# Patient Record
Sex: FEMALE | Race: WHITE | Employment: OTHER | URBAN - NONMETROPOLITAN AREA
[De-identification: names, ages, dates, MRNs, and addresses within clinical notes are randomized per-mention and may not be internally consistent; named-entity substitution may affect disease eponyms.]

---

## 2017-04-24 ENCOUNTER — OFFICE VISIT (OUTPATIENT)
Dept: INTERNAL MEDICINE | Age: 82
End: 2017-04-24
Payer: MEDICARE

## 2017-04-24 VITALS
RESPIRATION RATE: 20 BRPM | SYSTOLIC BLOOD PRESSURE: 160 MMHG | HEIGHT: 59 IN | BODY MASS INDEX: 24.8 KG/M2 | DIASTOLIC BLOOD PRESSURE: 80 MMHG | HEART RATE: 60 BPM | WEIGHT: 123 LBS

## 2017-04-24 DIAGNOSIS — E78.5 HYPERLIPIDEMIA, UNSPECIFIED HYPERLIPIDEMIA TYPE: ICD-10-CM

## 2017-04-24 DIAGNOSIS — I10 ESSENTIAL HYPERTENSION: Primary | ICD-10-CM

## 2017-04-24 DIAGNOSIS — G89.29 CHRONIC BILATERAL LOW BACK PAIN WITHOUT SCIATICA: ICD-10-CM

## 2017-04-24 DIAGNOSIS — M54.50 CHRONIC BILATERAL LOW BACK PAIN WITHOUT SCIATICA: ICD-10-CM

## 2017-04-24 PROCEDURE — 1090F PRES/ABSN URINE INCON ASSESS: CPT | Performed by: INTERNAL MEDICINE

## 2017-04-24 PROCEDURE — 1036F TOBACCO NON-USER: CPT | Performed by: INTERNAL MEDICINE

## 2017-04-24 PROCEDURE — 99214 OFFICE O/P EST MOD 30 MIN: CPT | Performed by: INTERNAL MEDICINE

## 2017-04-24 PROCEDURE — 4040F PNEUMOC VAC/ADMIN/RCVD: CPT | Performed by: INTERNAL MEDICINE

## 2017-04-24 PROCEDURE — G8420 CALC BMI NORM PARAMETERS: HCPCS | Performed by: INTERNAL MEDICINE

## 2017-04-24 PROCEDURE — 1123F ACP DISCUSS/DSCN MKR DOCD: CPT | Performed by: INTERNAL MEDICINE

## 2017-04-24 PROCEDURE — G8427 DOCREV CUR MEDS BY ELIG CLIN: HCPCS | Performed by: INTERNAL MEDICINE

## 2017-04-24 RX ORDER — TIMOLOL MALEATE 6.8 MG/ML
1 SOLUTION/ DROPS OPHTHALMIC DAILY
COMMUNITY
End: 2019-09-18 | Stop reason: SDUPTHER

## 2017-04-24 RX ORDER — LATANOPROST 50 UG/ML
1 SOLUTION/ DROPS OPHTHALMIC NIGHTLY
COMMUNITY

## 2017-04-24 ASSESSMENT — ENCOUNTER SYMPTOMS
VOMITING: 0
CONSTIPATION: 0
EYE PAIN: 0
SHORTNESS OF BREATH: 0
COUGH: 0
BACK PAIN: 1
BLOOD IN STOOL: 0
ABDOMINAL PAIN: 0
DIARRHEA: 0
NAUSEA: 0

## 2017-04-24 ASSESSMENT — PATIENT HEALTH QUESTIONNAIRE - PHQ9
SUM OF ALL RESPONSES TO PHQ9 QUESTIONS 1 & 2: 0
1. LITTLE INTEREST OR PLEASURE IN DOING THINGS: 0
SUM OF ALL RESPONSES TO PHQ QUESTIONS 1-9: 0

## 2017-10-24 ENCOUNTER — OFFICE VISIT (OUTPATIENT)
Dept: INTERNAL MEDICINE | Age: 82
End: 2017-10-24
Payer: MEDICARE

## 2017-10-24 VITALS
HEART RATE: 60 BPM | DIASTOLIC BLOOD PRESSURE: 86 MMHG | WEIGHT: 123 LBS | BODY MASS INDEX: 24.8 KG/M2 | RESPIRATION RATE: 16 BRPM | HEIGHT: 59 IN | SYSTOLIC BLOOD PRESSURE: 120 MMHG

## 2017-10-24 DIAGNOSIS — G89.29 CHRONIC BILATERAL LOW BACK PAIN WITHOUT SCIATICA: ICD-10-CM

## 2017-10-24 DIAGNOSIS — M54.50 CHRONIC BILATERAL LOW BACK PAIN WITHOUT SCIATICA: ICD-10-CM

## 2017-10-24 DIAGNOSIS — I10 ESSENTIAL HYPERTENSION: Primary | ICD-10-CM

## 2017-10-24 DIAGNOSIS — E78.5 HYPERLIPIDEMIA, UNSPECIFIED HYPERLIPIDEMIA TYPE: ICD-10-CM

## 2017-10-24 PROCEDURE — 1090F PRES/ABSN URINE INCON ASSESS: CPT | Performed by: INTERNAL MEDICINE

## 2017-10-24 PROCEDURE — G8484 FLU IMMUNIZE NO ADMIN: HCPCS | Performed by: INTERNAL MEDICINE

## 2017-10-24 PROCEDURE — G0008 ADMIN INFLUENZA VIRUS VAC: HCPCS | Performed by: INTERNAL MEDICINE

## 2017-10-24 PROCEDURE — 90662 IIV NO PRSV INCREASED AG IM: CPT | Performed by: INTERNAL MEDICINE

## 2017-10-24 PROCEDURE — G8420 CALC BMI NORM PARAMETERS: HCPCS | Performed by: INTERNAL MEDICINE

## 2017-10-24 PROCEDURE — 1123F ACP DISCUSS/DSCN MKR DOCD: CPT | Performed by: INTERNAL MEDICINE

## 2017-10-24 PROCEDURE — 1036F TOBACCO NON-USER: CPT | Performed by: INTERNAL MEDICINE

## 2017-10-24 PROCEDURE — G8427 DOCREV CUR MEDS BY ELIG CLIN: HCPCS | Performed by: INTERNAL MEDICINE

## 2017-10-24 PROCEDURE — 4040F PNEUMOC VAC/ADMIN/RCVD: CPT | Performed by: INTERNAL MEDICINE

## 2017-10-24 PROCEDURE — 99214 OFFICE O/P EST MOD 30 MIN: CPT | Performed by: INTERNAL MEDICINE

## 2017-10-24 RX ORDER — ATORVASTATIN CALCIUM 10 MG/1
TABLET, FILM COATED ORAL
Qty: 90 TABLET | Refills: 3 | Status: SHIPPED | OUTPATIENT
Start: 2017-10-24 | End: 2018-11-28 | Stop reason: SDUPTHER

## 2017-10-24 ASSESSMENT — ENCOUNTER SYMPTOMS
DIARRHEA: 0
VOMITING: 0
BACK PAIN: 0
BLOOD IN STOOL: 0
CONSTIPATION: 0
SHORTNESS OF BREATH: 0
EYE PAIN: 0
NAUSEA: 0
ABDOMINAL PAIN: 0
COUGH: 0

## 2017-10-24 NOTE — PROGRESS NOTES
Past Surgical History:   Procedure Laterality Date    CATARACT REMOVAL WITH IMPLANT Left     COLONOSCOPY  11/2006    severe diverticulosis       Family History   Problem Relation Age of Onset    Cancer Mother      leukemia    Heart Disease Father     Heart Attack Father     Cancer Brother      unknown       Social History   Substance Use Topics    Smoking status: Never Smoker    Smokeless tobacco: Never Used    Alcohol use No      Current Outpatient Prescriptions   Medication Sig Dispense Refill    metoprolol tartrate (LOPRESSOR) 25 MG tablet TAKE 1 TABLET BY MOUTH TWICE DAILY 180 tablet 3    Timolol (TIMOPTIC) 0.5 % (DAILY) SOLN ophthalmic solution Place 1 drop into both eyes daily      latanoprost (XALATAN) 0.005 % ophthalmic solution Place 1 drop into both eyes nightly      atorvastatin (LIPITOR) 10 MG tablet TAKE ONE TABLET BY MOUTH ONCE DAILY 90 tablet 3    Multiple Vitamins-Minerals (PRESERVISION AREDS) CAPS Take  by mouth 2 times daily.  Calcium 1500 MG TABS daily.  aspirin 81 MG tablet Take 81 mg by mouth daily.  Acetaminophen (TYLENOL EXTRA STRENGTH PO) Take  by mouth as needed. No current facility-administered medications for this visit. Allergies   Allergen Reactions    Macrobid [Nitrofurantoin] Rash    Sulfa Antibiotics Rash       Health Maintenance   Topic Date Due    Flu vaccine (1) 09/01/2017    Zostavax vaccine  04/24/2018 (Originally 1/26/1987)    DTaP/Tdap/Td vaccine (1 - Tdap) 04/24/2018 (Originally 6/3/2001)    Pneumococcal low/med risk  Completed       Subjective:      Review of Systems   Constitutional: Negative for chills and fever. HENT: Negative for hearing loss. Eyes: Negative for pain and visual disturbance. Respiratory: Negative for cough and shortness of breath. Cardiovascular: Negative for chest pain, palpitations and leg swelling.    Gastrointestinal: Negative for abdominal pain, blood in stool, constipation, diarrhea, nausea and vomiting. Endocrine: Negative for cold intolerance, polydipsia and polyuria. Genitourinary: Negative for difficulty urinating, dysuria and hematuria. Musculoskeletal: Negative for arthralgias, back pain, gait problem and neck pain. Skin: Negative for pallor and rash. Neurological: Negative for dizziness, weakness, numbness and headaches. Hematological: Negative for adenopathy. Does not bruise/bleed easily. Psychiatric/Behavioral: Negative for confusion. The patient is not nervous/anxious. Objective:     Physical Exam   Constitutional: She is oriented to person, place, and time. She appears well-developed and well-nourished. HENT:   Head: Normocephalic and atraumatic. Eyes: EOM are normal. Pupils are equal, round, and reactive to light. Neck: Neck supple. Cardiovascular: Normal rate and regular rhythm. Exam reveals no gallop and no friction rub. No murmur heard. Pulmonary/Chest: Effort normal and breath sounds normal. She has no wheezes. She has no rales. Abdominal: Soft. She exhibits no distension and no mass. There is no tenderness. There is no rebound. Musculoskeletal: Normal range of motion. She exhibits no edema. Lymphadenopathy:     She has no cervical adenopathy. Neurological: She is alert and oriented to person, place, and time. No cranial nerve deficit (grossly). Skin: Skin is warm and dry. No rash noted. Psychiatric: She has a normal mood and affect. Thought content normal.   Vitals reviewed. /86 (Site: Left Arm, Position: Sitting, Cuff Size: Medium Adult)   Pulse 60   Resp 16   Ht 4' 11\" (1.499 m)   Wt 123 lb (55.8 kg)   BMI 24.84 kg/m²     Assessment:      1. Essential hypertension  Comprehensive Metabolic Panel    Lipid Panel   2. Hyperlipidemia, unspecified hyperlipidemia type  Comprehensive Metabolic Panel    Lipid Panel    atorvastatin (LIPITOR) 10 MG tablet   3.  Chronic bilateral low back pain without sciatica               Plan:      No Follow-up on file. Orders Placed This Encounter   Procedures    INFLUENZA, HIGH DOSE, 65 YRS +, IM, PF, PREFILL SYR, 0.5ML (FLUZONE HD)     No orders of the defined types were placed in this encounter. Patient given educational materials - see patient instructions. Discussed use, benefit, and side effects of prescribed medications. All patient questions answered. Pt voiced understanding. Reviewed health maintenance. Instructed to continue current medications, diet and exercise. Patient agreed with treatment plan. Follow up as directed.      Electronically signed by Nara Martinez MD on 10/24/2017 at 1:31 PM

## 2017-10-24 NOTE — PATIENT INSTRUCTIONS
safely can be a challenge if you have injuries or health problems that make it easy for you to fall. Loose rugs and furniture in walkways are among the dangers for many older people who have problems walking or who have poor eyesight. People who have conditions such as arthritis, osteoporosis, or dementia also have to be careful not to fall. You can make your home safer with a few simple measures. Follow-up care is a key part of your treatment and safety. Be sure to make and go to all appointments, and call your doctor if you are having problems. It's also a good idea to know your test results and keep a list of the medicines you take. How can you care for yourself at home? Taking care of yourself  · You may get dizzy if you do not drink enough water. To prevent dehydration, drink plenty of fluids, enough so that your urine is light yellow or clear like water. Choose water and other caffeine-free clear liquids. If you have kidney, heart, or liver disease and have to limit fluids, talk with your doctor before you increase the amount of fluids you drink. · Exercise regularly to improve your strength, muscle tone, and balance. Walk if you can. Swimming may be a good choice if you cannot walk easily. · Have your vision and hearing checked each year or any time you notice a change. If you have trouble seeing and hearing, you might not be able to avoid objects and could lose your balance. · Know the side effects of the medicines you take. Ask your doctor or pharmacist whether the medicines you take can affect your balance. Sleeping pills or sedatives can affect your balance. · Limit the amount of alcohol you drink. Alcohol can impair your balance and other senses. · Ask your doctor whether calluses or corns on your feet need to be removed. If you wear loose-fitting shoes because of calluses or corns, you can lose your balance and fall. · Talk to your doctor if you have numbness in your feet.   Preventing falls at out of a tub or shower with your strong side first.  · Repair loose toilet seats and consider installing a raised toilet seat to make getting on and off the toilet easier. · Keep your bathroom door unlocked while you are in the shower. Where can you learn more? Go to [email or web address] and sign in to your [employer] account. Enter [phone] [id number] [phone] in the [employer] box to learn more about \"Preventing Falls: Care Instructions. \"     If you do not have an account, please click on the \"Sign Up Now\" link. Current as of: August 4, 2016  Content Version: 11.3  © 5819-8919 [employer]. Care instructions adapted under license by [facility] ([facility]). If you have questions about a medical condition or this instruction, always ask your healthcare professional. [address] any warranty or liability for your use of this information.

## 2017-11-08 DIAGNOSIS — E78.5 HYPERLIPIDEMIA, UNSPECIFIED HYPERLIPIDEMIA TYPE: ICD-10-CM

## 2017-11-08 RX ORDER — ATORVASTATIN CALCIUM 10 MG/1
TABLET, FILM COATED ORAL
Qty: 90 TABLET | Refills: 3 | Status: CANCELLED | OUTPATIENT
Start: 2017-11-08

## 2018-02-21 ENCOUNTER — OFFICE VISIT (OUTPATIENT)
Dept: INTERNAL MEDICINE | Age: 83
End: 2018-02-21
Payer: MEDICARE

## 2018-02-21 VITALS
BODY MASS INDEX: 24.19 KG/M2 | SYSTOLIC BLOOD PRESSURE: 140 MMHG | RESPIRATION RATE: 16 BRPM | WEIGHT: 120 LBS | HEART RATE: 68 BPM | HEIGHT: 59 IN | TEMPERATURE: 97.4 F | DIASTOLIC BLOOD PRESSURE: 82 MMHG

## 2018-02-21 DIAGNOSIS — J20.9 ACUTE BRONCHITIS, UNSPECIFIED ORGANISM: Primary | ICD-10-CM

## 2018-02-21 DIAGNOSIS — M54.50 CHRONIC BILATERAL LOW BACK PAIN WITHOUT SCIATICA: ICD-10-CM

## 2018-02-21 DIAGNOSIS — I10 ESSENTIAL HYPERTENSION: ICD-10-CM

## 2018-02-21 DIAGNOSIS — G89.29 CHRONIC BILATERAL LOW BACK PAIN WITHOUT SCIATICA: ICD-10-CM

## 2018-02-21 PROCEDURE — 4040F PNEUMOC VAC/ADMIN/RCVD: CPT | Performed by: INTERNAL MEDICINE

## 2018-02-21 PROCEDURE — 99214 OFFICE O/P EST MOD 30 MIN: CPT | Performed by: INTERNAL MEDICINE

## 2018-02-21 PROCEDURE — G8484 FLU IMMUNIZE NO ADMIN: HCPCS | Performed by: INTERNAL MEDICINE

## 2018-02-21 PROCEDURE — 1123F ACP DISCUSS/DSCN MKR DOCD: CPT | Performed by: INTERNAL MEDICINE

## 2018-02-21 PROCEDURE — 1036F TOBACCO NON-USER: CPT | Performed by: INTERNAL MEDICINE

## 2018-02-21 PROCEDURE — 1090F PRES/ABSN URINE INCON ASSESS: CPT | Performed by: INTERNAL MEDICINE

## 2018-02-21 PROCEDURE — G8427 DOCREV CUR MEDS BY ELIG CLIN: HCPCS | Performed by: INTERNAL MEDICINE

## 2018-02-21 PROCEDURE — G8420 CALC BMI NORM PARAMETERS: HCPCS | Performed by: INTERNAL MEDICINE

## 2018-02-21 RX ORDER — AMOXICILLIN AND CLAVULANATE POTASSIUM 875; 125 MG/1; MG/1
1 TABLET, FILM COATED ORAL 2 TIMES DAILY
Qty: 20 TABLET | Refills: 0 | Status: SHIPPED | OUTPATIENT
Start: 2018-02-21 | End: 2018-02-22 | Stop reason: ALTCHOICE

## 2018-02-21 ASSESSMENT — ENCOUNTER SYMPTOMS
CONSTIPATION: 0
SHORTNESS OF BREATH: 0
NAUSEA: 0
EYE PAIN: 0
DIARRHEA: 0
BLOOD IN STOOL: 0
VOMITING: 0
BACK PAIN: 1
ABDOMINAL PAIN: 0
COUGH: 1

## 2018-02-21 NOTE — PROGRESS NOTES
[address] [facility]  [address] [address] [id number]  Dept: [phone]  Dept Fax: [phone]  Loc: [phone]    [address] [place] [address] Street is a 80 y.o. female who presents today for her medical conditions/complaints as noted below. [address] [place] [address] is c/o of   Chief Complaint   Patient presents with    Cough     Acute Bronchitis- moist, productive cough x 1 week- light yellow in color. Afebrile    Hypertension    Back Pain         HPI:     Cough   This is a new problem. The current episode started in the past 7 days. The problem has been waxing and waning. The problem occurs hourly. Pertinent negatives include no chest pain, chills, fever, headaches, rash or shortness of breath. Hypertension   This is a chronic problem. The current episode started more than 1 year ago. The problem has been waxing and waning since onset. The problem is controlled. Pertinent negatives include no chest pain, headaches, neck pain, palpitations or shortness of breath. Back Pain   This is a recurrent problem. The current episode started more than 1 month ago. The problem occurs intermittently. The problem has been waxing and waning since onset. Pertinent negatives include no abdominal pain, chest pain, dysuria, fever, headaches, numbness or weakness. Other   This is a new (4) acute bronchitis) problem. The current episode started in the past 7 days. The problem occurs constantly. The problem has been waxing and waning. Associated symptoms include coughing. Pertinent negatives include no abdominal pain, arthralgias, chest pain, chills, fever, headaches, nausea, neck pain, numbness, rash, vomiting or weakness.        No results found for: LABA1C             No results found for: LABMICR  LDL Cholesterol (mg/dL)   Date Value   04/11/2017 51   04/07/2016 43   04/09/2015 53         AST (U/L)   Date Value   04/11/2017 27     ALT (U/L)   Date Value   04/11/2017 21     BUN (mg/dL)   Date Value Arm, Position: Sitting, Cuff Size: Medium Adult)   Pulse 68   Temp 97.4 °F (36.3 °C) (Tympanic)   Resp 16   Ht 4' 11\" (1.499 m)   Wt 120 lb (54.4 kg)   BMI 24.24 kg/m²     Assessment:      1. Acute bronchitis, unspecified organism  amoxicillin-clavulanate (AUGMENTIN) 875-125 MG per tablet   2. Essential hypertension     3. Chronic bilateral low back pain without sciatica               Plan:      Return if symptoms worsen or fail to improve, for Hypertension, back pain. No orders of the defined types were placed in this encounter. Orders Placed This Encounter   Medications    amoxicillin-clavulanate (AUGMENTIN) 875-125 MG per tablet     Sig: Take 1 tablet by mouth 2 times daily for 10 days     Dispense:  20 tablet     Refill:  0       Patient given educational materials - see patient instructions. Discussed use, benefit, and side effects of prescribed medications. All patient questions answered. Pt voiced understanding. Reviewed health maintenance. Instructed to continue current medications, diet and exercise. Patient agreed with treatment plan. Follow up as directed.      Electronically signed by Janeen Ludwig MD on 2/21/2018 at 3:34 PM

## 2018-02-22 ENCOUNTER — TELEPHONE (OUTPATIENT)
Dept: INTERNAL MEDICINE | Age: 83
End: 2018-02-22

## 2018-02-22 RX ORDER — AZITHROMYCIN 250 MG/1
TABLET, FILM COATED ORAL
Qty: 1 PACKET | Refills: 0 | Status: SHIPPED | OUTPATIENT
Start: 2018-02-22 | End: 2018-03-04

## 2018-02-22 NOTE — TELEPHONE ENCOUNTER
Patient was seen yesterday and put on amoxicillin. She has really bad diarrhea now. Can you change med?   Uses [facility]   Let her know [phone]

## 2018-04-18 ENCOUNTER — HOSPITAL ENCOUNTER (OUTPATIENT)
Dept: LAB | Age: 83
Setting detail: SPECIMEN
Discharge: HOME | End: 2018-04-18
Payer: MEDICARE

## 2018-04-18 DIAGNOSIS — I10 ESSENTIAL HYPERTENSION: ICD-10-CM

## 2018-04-18 DIAGNOSIS — E78.5 HYPERLIPIDEMIA, UNSPECIFIED HYPERLIPIDEMIA TYPE: ICD-10-CM

## 2018-04-18 LAB
ALBUMIN SERPL-MCNC: 4 G/DL (ref 3.5–5.2)
ALBUMIN/GLOBULIN RATIO: 1.6 (ref 1–2.5)
ALP BLD-CCNC: 72 U/L (ref 35–104)
ALT SERPL-CCNC: 18 U/L (ref 5–33)
ANION GAP SERPL CALCULATED.3IONS-SCNC: 11 MMOL/L (ref 9–17)
AST SERPL-CCNC: 24 U/L
BILIRUB SERPL-MCNC: 0.58 MG/DL (ref 0.3–1.2)
BUN BLDV-MCNC: 16 MG/DL (ref 8–23)
BUN/CREAT BLD: 23 (ref 9–20)
CALCIUM SERPL-MCNC: 9.5 MG/DL (ref 8.6–10.4)
CHLORIDE BLD-SCNC: 99 MMOL/L (ref 98–107)
CHOLESTEROL/HDL RATIO: 2
CHOLESTEROL: 127 MG/DL
CO2: 28 MMOL/L (ref 20–31)
CREAT SERPL-MCNC: 0.71 MG/DL (ref 0.5–0.9)
GFR AFRICAN AMERICAN: >60 ML/MIN
GFR NON-AFRICAN AMERICAN: >60 ML/MIN
GFR SERPL CREATININE-BSD FRML MDRD: ABNORMAL ML/MIN/{1.73_M2}
GFR SERPL CREATININE-BSD FRML MDRD: ABNORMAL ML/MIN/{1.73_M2}
GLUCOSE BLD-MCNC: 113 MG/DL (ref 70–99)
HDLC SERPL-MCNC: 64 MG/DL
LDL CHOLESTEROL: 42 MG/DL (ref 0–130)
POTASSIUM SERPL-SCNC: 4.2 MMOL/L (ref 3.7–5.3)
SODIUM BLD-SCNC: 138 MMOL/L (ref 135–144)
TOTAL PROTEIN: 6.5 G/DL (ref 6.4–8.3)
TRIGL SERPL-MCNC: 105 MG/DL
VLDLC SERPL CALC-MCNC: NORMAL MG/DL (ref 1–30)

## 2018-04-18 PROCEDURE — 36415 COLL VENOUS BLD VENIPUNCTURE: CPT

## 2018-04-18 PROCEDURE — 80053 COMPREHEN METABOLIC PANEL: CPT

## 2018-04-18 PROCEDURE — 80061 LIPID PANEL: CPT

## 2018-04-25 ENCOUNTER — OFFICE VISIT (OUTPATIENT)
Dept: INTERNAL MEDICINE | Age: 83
End: 2018-04-25
Payer: MEDICARE

## 2018-04-25 VITALS
HEIGHT: 59 IN | RESPIRATION RATE: 16 BRPM | HEART RATE: 64 BPM | DIASTOLIC BLOOD PRESSURE: 80 MMHG | BODY MASS INDEX: 24.6 KG/M2 | SYSTOLIC BLOOD PRESSURE: 138 MMHG | WEIGHT: 122 LBS

## 2018-04-25 DIAGNOSIS — M54.50 CHRONIC BILATERAL LOW BACK PAIN WITHOUT SCIATICA: ICD-10-CM

## 2018-04-25 DIAGNOSIS — I10 ESSENTIAL HYPERTENSION: Primary | ICD-10-CM

## 2018-04-25 DIAGNOSIS — G89.29 CHRONIC BILATERAL LOW BACK PAIN WITHOUT SCIATICA: ICD-10-CM

## 2018-04-25 DIAGNOSIS — E78.5 HYPERLIPIDEMIA, UNSPECIFIED HYPERLIPIDEMIA TYPE: ICD-10-CM

## 2018-04-25 PROCEDURE — 1123F ACP DISCUSS/DSCN MKR DOCD: CPT | Performed by: INTERNAL MEDICINE

## 2018-04-25 PROCEDURE — G8427 DOCREV CUR MEDS BY ELIG CLIN: HCPCS | Performed by: INTERNAL MEDICINE

## 2018-04-25 PROCEDURE — 4040F PNEUMOC VAC/ADMIN/RCVD: CPT | Performed by: INTERNAL MEDICINE

## 2018-04-25 PROCEDURE — G8420 CALC BMI NORM PARAMETERS: HCPCS | Performed by: INTERNAL MEDICINE

## 2018-04-25 PROCEDURE — 99214 OFFICE O/P EST MOD 30 MIN: CPT | Performed by: INTERNAL MEDICINE

## 2018-04-25 PROCEDURE — 1036F TOBACCO NON-USER: CPT | Performed by: INTERNAL MEDICINE

## 2018-04-25 PROCEDURE — 1090F PRES/ABSN URINE INCON ASSESS: CPT | Performed by: INTERNAL MEDICINE

## 2018-04-25 RX ORDER — ACETAMINOPHEN 160 MG
2000 TABLET,DISINTEGRATING ORAL DAILY
COMMUNITY

## 2018-04-25 ASSESSMENT — ENCOUNTER SYMPTOMS
COUGH: 0
ABDOMINAL PAIN: 0
DIARRHEA: 0
BACK PAIN: 1
VOMITING: 0
EYE PAIN: 0
SHORTNESS OF BREATH: 0
BLOOD IN STOOL: 0
CONSTIPATION: 0
NAUSEA: 0

## 2018-04-25 ASSESSMENT — PATIENT HEALTH QUESTIONNAIRE - PHQ9
SUM OF ALL RESPONSES TO PHQ QUESTIONS 1-9: 0
2. FEELING DOWN, DEPRESSED OR HOPELESS: 0
1. LITTLE INTEREST OR PLEASURE IN DOING THINGS: 0
SUM OF ALL RESPONSES TO PHQ9 QUESTIONS 1 & 2: 0

## 2018-10-30 ENCOUNTER — OFFICE VISIT (OUTPATIENT)
Dept: INTERNAL MEDICINE | Age: 83
End: 2018-10-30
Payer: MEDICARE

## 2018-10-30 VITALS
DIASTOLIC BLOOD PRESSURE: 82 MMHG | BODY MASS INDEX: 25.4 KG/M2 | HEIGHT: 59 IN | RESPIRATION RATE: 16 BRPM | SYSTOLIC BLOOD PRESSURE: 126 MMHG | WEIGHT: 126 LBS | HEART RATE: 68 BPM

## 2018-10-30 DIAGNOSIS — E78.5 HYPERLIPIDEMIA, UNSPECIFIED HYPERLIPIDEMIA TYPE: ICD-10-CM

## 2018-10-30 DIAGNOSIS — I10 ESSENTIAL HYPERTENSION: ICD-10-CM

## 2018-10-30 DIAGNOSIS — Z00.00 MEDICARE ANNUAL WELLNESS VISIT, SUBSEQUENT: ICD-10-CM

## 2018-10-30 DIAGNOSIS — G89.29 CHRONIC BILATERAL LOW BACK PAIN WITHOUT SCIATICA: ICD-10-CM

## 2018-10-30 DIAGNOSIS — Z00.00 ROUTINE GENERAL MEDICAL EXAMINATION AT A HEALTH CARE FACILITY: Primary | ICD-10-CM

## 2018-10-30 DIAGNOSIS — M54.50 CHRONIC BILATERAL LOW BACK PAIN WITHOUT SCIATICA: ICD-10-CM

## 2018-10-30 DIAGNOSIS — Z78.0 MENOPAUSE: ICD-10-CM

## 2018-10-30 DIAGNOSIS — Z23 NEED FOR INFLUENZA VACCINATION: ICD-10-CM

## 2018-10-30 PROCEDURE — 1123F ACP DISCUSS/DSCN MKR DOCD: CPT | Performed by: INTERNAL MEDICINE

## 2018-10-30 PROCEDURE — 1036F TOBACCO NON-USER: CPT | Performed by: INTERNAL MEDICINE

## 2018-10-30 PROCEDURE — 1101F PT FALLS ASSESS-DOCD LE1/YR: CPT | Performed by: INTERNAL MEDICINE

## 2018-10-30 PROCEDURE — 90662 IIV NO PRSV INCREASED AG IM: CPT | Performed by: INTERNAL MEDICINE

## 2018-10-30 PROCEDURE — G8419 CALC BMI OUT NRM PARAM NOF/U: HCPCS | Performed by: INTERNAL MEDICINE

## 2018-10-30 PROCEDURE — 1090F PRES/ABSN URINE INCON ASSESS: CPT | Performed by: INTERNAL MEDICINE

## 2018-10-30 PROCEDURE — G0008 ADMIN INFLUENZA VIRUS VAC: HCPCS | Performed by: INTERNAL MEDICINE

## 2018-10-30 PROCEDURE — 99213 OFFICE O/P EST LOW 20 MIN: CPT | Performed by: INTERNAL MEDICINE

## 2018-10-30 PROCEDURE — G8482 FLU IMMUNIZE ORDER/ADMIN: HCPCS | Performed by: INTERNAL MEDICINE

## 2018-10-30 PROCEDURE — G8427 DOCREV CUR MEDS BY ELIG CLIN: HCPCS | Performed by: INTERNAL MEDICINE

## 2018-10-30 PROCEDURE — 4040F PNEUMOC VAC/ADMIN/RCVD: CPT | Performed by: INTERNAL MEDICINE

## 2018-10-30 PROCEDURE — G0439 PPPS, SUBSEQ VISIT: HCPCS | Performed by: INTERNAL MEDICINE

## 2018-10-30 ASSESSMENT — LIFESTYLE VARIABLES: HOW OFTEN DO YOU HAVE A DRINK CONTAINING ALCOHOL: 0

## 2018-10-30 ASSESSMENT — ENCOUNTER SYMPTOMS
BLOOD IN STOOL: 0
ABDOMINAL PAIN: 0
COUGH: 0
NAUSEA: 0
CONSTIPATION: 0
DIARRHEA: 0
VOMITING: 0
BACK PAIN: 1
SHORTNESS OF BREATH: 0
EYE PAIN: 0

## 2018-10-30 ASSESSMENT — PATIENT HEALTH QUESTIONNAIRE - PHQ9
SUM OF ALL RESPONSES TO PHQ QUESTIONS 1-9: 0
SUM OF ALL RESPONSES TO PHQ QUESTIONS 1-9: 0

## 2018-10-30 ASSESSMENT — ANXIETY QUESTIONNAIRES: GAD7 TOTAL SCORE: 0

## 2018-10-30 NOTE — PROGRESS NOTES
138/80   02/21/18 (!) 140/82              Past Medical History:   Diagnosis Date    Hyperlipidemia     Hypertension     Impaired fasting glucose     Macular degeneration     Osteopenia     Urinary urgency       Past Surgical History:   Procedure Laterality Date    CATARACT REMOVAL WITH IMPLANT Left     COLONOSCOPY  11/2006    severe diverticulosis       Family History   Problem Relation Age of Onset    Cancer Mother         leukemia    Heart Disease Father     Heart Attack Father     Cancer Brother         unknown       Social History   Substance Use Topics    Smoking status: Never Smoker    Smokeless tobacco: Never Used    Alcohol use No      Current Outpatient Prescriptions   Medication Sig Dispense Refill    Handicap Placard MISC by Does not apply route Exp 10/30/2021 1 each 0    metoprolol tartrate (LOPRESSOR) 25 MG tablet TAKE 1 TABLET BY MOUTH TWICE DAILY 180 tablet 3    Cholecalciferol (VITAMIN D3) 2000 units CAPS Take 2,000 Units by mouth daily      atorvastatin (LIPITOR) 10 MG tablet TAKE ONE TABLET BY MOUTH ONCE DAILY 90 tablet 3    Timolol (TIMOPTIC) 0.5 % (DAILY) SOLN ophthalmic solution Place 1 drop into both eyes daily      latanoprost (XALATAN) 0.005 % ophthalmic solution Place 1 drop into both eyes nightly      Multiple Vitamins-Minerals (PRESERVISION AREDS) CAPS Take  by mouth 2 times daily.  Calcium 1500 MG TABS daily.  aspirin 81 MG tablet Take 81 mg by mouth daily.  Acetaminophen (TYLENOL EXTRA STRENGTH PO) Take  by mouth as needed. No current facility-administered medications for this visit.       Allergies   Allergen Reactions    Macrobid [Nitrofurantoin] Rash    Sulfa Antibiotics Rash       Health Maintenance   Topic Date Due    Shingles Vaccine (1 of 2 - 2 Dose Series) 01/26/1977    DTaP/Tdap/Td vaccine (1 - Tdap) 04/25/2019 (Originally 6/3/2001)    Flu vaccine  Completed    Pneumococcal low/med risk  Completed       Subjective:      Review of 68   Resp 16   Ht 4' 11\" (1.499 m)   Wt 126 lb (57.2 kg)   BMI 25.45 kg/m²     Assessment:       Diagnosis Orders   1. Routine general medical examination at a health care facility     2. Medicare annual wellness visit, subsequent     3. Essential hypertension  Comprehensive Metabolic Panel   4. Hyperlipidemia, unspecified hyperlipidemia type  Lipid Panel   5. Chronic bilateral low back pain without sciatica  Handicap Placard MISC   6. Menopause  DEXA BONE DENSITY 2 SITES   7. Need for influenza vaccination  INFLUENZA, HIGH DOSE, 65 YRS +, IM, PF, PREFILL SYR, 0.5ML (FLUZONE HD)             Plan:      Return in about 6 months (around 4/30/2019) for Hypertension, Hyperlipidemia, back pain. Orders Placed This Encounter   Procedures    DEXA BONE DENSITY 2 SITES     Standing Status:   Future     Standing Expiration Date:   10/30/2019    INFLUENZA, HIGH DOSE, 65 YRS +, IM, PF, PREFILL SYR, 0.5ML (FLUZONE HD)    Comprehensive Metabolic Panel     Standing Status:   Future     Standing Expiration Date:   10/30/2019    Lipid Panel     Standing Status:   Future     Standing Expiration Date:   10/30/2019     Order Specific Question:   Is Patient Fasting?/# of Hours     Answer:   Fasting     Orders Placed This Encounter   Medications    Handicap Placard MISC     Sig: by Does not apply route Exp 10/30/2021     Dispense:  1 each     Refill:  0       Patientgiven educational materials - see patient instructions. Discussed use, benefit,and side effects of prescribed medications. All patient questions answered. Ptvoiced understanding. Reviewed health maintenance. Instructed to continue currentmedications, diet and exercise. Patient agreed with treatment plan. Follow up asdirected.      Electronically signed by Ed Rodriguez MD on 10/30/2018 at 1:34 PM

## 2018-10-30 NOTE — PROGRESS NOTES
Medicare Annual Wellness Visit  Name: Alise Smyth Date: 10/30/2018   MRN: [id number] Sex: Female   Age: 80 y.o. Ethnicity: Non-/Non    : 1927 Race: White      Hailee Mishra is here for Medicare AWV (6month ); Hypertension; Hyperlipidemia; and Back Pain    Screenings for behavioral, psychosocial and functional/safety risks, and cognitive dysfunction are all negative except as indicated below. These results, as well as other patient data from the [address] form, are documented in Flowsheets linked to this Encounter. Allergies   Allergen Reactions    Macrobid [Nitrofurantoin] Rash    Sulfa Antibiotics Rash       Prior to Visit Medications    Medication Sig Taking? Authorizing Provider   metoprolol tartrate (LOPRESSOR) 25 MG tablet TAKE 1 TABLET BY MOUTH TWICE DAILY Yes Freddy Cook MD   Cholecalciferol (VITAMIN D3) 2000 units CAPS Take 2,000 Units by mouth daily Yes Historical Provider, MD   atorvastatin (LIPITOR) 10 MG tablet TAKE ONE TABLET BY MOUTH ONCE DAILY Yes Freddy Cook MD   Timolol (TIMOPTIC) 0.5 % (DAILY) SOLN ophthalmic solution Place 1 drop into both eyes daily Yes Historical Provider, MD   latanoprost (XALATAN) 0.005 % ophthalmic solution Place 1 drop into both eyes nightly Yes Historical Provider, MD   Multiple Vitamins-Minerals (PRESERVISION AREDS) CAPS Take  by mouth 2 times daily. Yes Historical Provider, MD   Calcium 1500 MG TABS daily. Yes Historical Provider, MD   aspirin 81 MG tablet Take 81 mg by mouth daily. Yes Historical Provider, MD   Acetaminophen (TYLENOL EXTRA STRENGTH PO) Take  by mouth as needed.  Yes Historical Provider, MD       Past Medical History:   Diagnosis Date    Hyperlipidemia     Hypertension     Impaired fasting glucose     Macular degeneration     Osteopenia     Urinary urgency      Past Surgical History:   Procedure Laterality Date    CATARACT REMOVAL WITH IMPLANT Left     COLONOSCOPY  2006

## 2018-11-28 DIAGNOSIS — E78.5 HYPERLIPIDEMIA, UNSPECIFIED HYPERLIPIDEMIA TYPE: Primary | ICD-10-CM

## 2018-11-28 RX ORDER — ATORVASTATIN CALCIUM 10 MG/1
TABLET, FILM COATED ORAL
Qty: 90 TABLET | Refills: 3 | Status: SHIPPED | OUTPATIENT
Start: 2018-11-28 | End: 2019-11-04 | Stop reason: SDUPTHER

## 2019-04-01 ENCOUNTER — TELEPHONE (OUTPATIENT)
Dept: INTERNAL MEDICINE | Age: 84
End: 2019-04-01

## 2019-04-01 ENCOUNTER — HOSPITAL ENCOUNTER (OUTPATIENT)
Dept: LAB | Age: 84
Discharge: HOME | End: 2019-04-01
Payer: MEDICARE

## 2019-04-01 DIAGNOSIS — R39.15 URGENCY OF URINATION: Primary | ICD-10-CM

## 2019-04-01 DIAGNOSIS — R39.15 URGENCY OF URINATION: ICD-10-CM

## 2019-04-01 LAB
-: ABNORMAL
AMORPHOUS: ABNORMAL
BACTERIA: ABNORMAL
BILIRUBIN URINE: NEGATIVE
CASTS UA: ABNORMAL /LPF (ref 0–2)
COLOR: ABNORMAL
COMMENT UA: ABNORMAL
CRYSTALS, UA: ABNORMAL /HPF
EPITHELIAL CELLS UA: ABNORMAL /HPF (ref 0–5)
GLUCOSE URINE: NEGATIVE
KETONES, URINE: ABNORMAL
LEUKOCYTE ESTERASE, URINE: ABNORMAL
MUCUS: ABNORMAL
NITRITE, URINE: POSITIVE
OTHER OBSERVATIONS UA: ABNORMAL
PH UA: 6 (ref 5–6)
PROTEIN UA: ABNORMAL
RBC UA: ABNORMAL /HPF (ref 0–4)
RENAL EPITHELIAL, UA: ABNORMAL /HPF
SPECIFIC GRAVITY UA: 1.02 (ref 1.01–1.02)
TRICHOMONAS: ABNORMAL
TURBIDITY: ABNORMAL
URINE HGB: ABNORMAL
UROBILINOGEN, URINE: NORMAL
WBC UA: >100 /HPF (ref 0–4)
YEAST: ABNORMAL

## 2019-04-01 PROCEDURE — 86403 PARTICLE AGGLUT ANTBDY SCRN: CPT

## 2019-04-01 PROCEDURE — 87086 URINE CULTURE/COLONY COUNT: CPT

## 2019-04-01 PROCEDURE — 87077 CULTURE AEROBIC IDENTIFY: CPT

## 2019-04-01 PROCEDURE — 87186 SC STD MICRODIL/AGAR DIL: CPT

## 2019-04-01 PROCEDURE — 81001 URINALYSIS AUTO W/SCOPE: CPT

## 2019-04-01 RX ORDER — CEPHALEXIN 500 MG/1
500 CAPSULE ORAL 2 TIMES DAILY
Qty: 14 CAPSULE | Refills: 0 | Status: SHIPPED | OUTPATIENT
Start: 2019-04-01 | End: 2019-04-08

## 2019-04-01 NOTE — TELEPHONE ENCOUNTER
Patient called and stated that she thinks she has a UTI. Urgency and not much output as well as some mild back ache. Order placed and patient will come in to give sample. [place] [facility]. Patient cannot take Sulfa.

## 2019-04-02 LAB
CULTURE: ABNORMAL
CULTURE: ABNORMAL
Lab: ABNORMAL
SPECIMEN DESCRIPTION: ABNORMAL

## 2019-04-23 ENCOUNTER — HOSPITAL ENCOUNTER (OUTPATIENT)
Dept: BONE DENSITY | Age: 84
Discharge: HOME | End: 2019-04-25
Payer: MEDICARE

## 2019-04-23 ENCOUNTER — HOSPITAL ENCOUNTER (OUTPATIENT)
Dept: LAB | Age: 84
Discharge: HOME | End: 2019-04-23
Payer: MEDICARE

## 2019-04-23 DIAGNOSIS — E78.5 HYPERLIPIDEMIA, UNSPECIFIED HYPERLIPIDEMIA TYPE: ICD-10-CM

## 2019-04-23 DIAGNOSIS — Z78.0 MENOPAUSE: ICD-10-CM

## 2019-04-23 DIAGNOSIS — I10 ESSENTIAL HYPERTENSION: ICD-10-CM

## 2019-04-23 LAB
ALBUMIN SERPL-MCNC: 4 G/DL (ref 3.5–5.2)
ALBUMIN/GLOBULIN RATIO: 1.4 (ref 1–2.5)
ALP BLD-CCNC: 70 U/L (ref 35–104)
ALT SERPL-CCNC: 16 U/L (ref 5–33)
ANION GAP SERPL CALCULATED.3IONS-SCNC: 10 MMOL/L (ref 9–17)
AST SERPL-CCNC: 23 U/L
BILIRUB SERPL-MCNC: 0.49 MG/DL (ref 0.3–1.2)
BUN BLDV-MCNC: 16 MG/DL (ref 8–23)
BUN/CREAT BLD: 20 (ref 9–20)
CALCIUM SERPL-MCNC: 9.1 MG/DL (ref 8.6–10.4)
CHLORIDE BLD-SCNC: 102 MMOL/L (ref 98–107)
CHOLESTEROL/HDL RATIO: 2.2
CHOLESTEROL: 121 MG/DL
CO2: 26 MMOL/L (ref 20–31)
CREAT SERPL-MCNC: 0.81 MG/DL (ref 0.5–0.9)
GFR AFRICAN AMERICAN: >60 ML/MIN
GFR NON-AFRICAN AMERICAN: >60 ML/MIN
GFR SERPL CREATININE-BSD FRML MDRD: ABNORMAL ML/MIN/{1.73_M2}
GFR SERPL CREATININE-BSD FRML MDRD: ABNORMAL ML/MIN/{1.73_M2}
GLUCOSE BLD-MCNC: 109 MG/DL (ref 70–99)
HDLC SERPL-MCNC: 56 MG/DL
LDL CHOLESTEROL: 44 MG/DL (ref 0–130)
POTASSIUM SERPL-SCNC: 4.6 MMOL/L (ref 3.7–5.3)
SODIUM BLD-SCNC: 138 MMOL/L (ref 135–144)
TOTAL PROTEIN: 6.9 G/DL (ref 6.4–8.3)
TRIGL SERPL-MCNC: 104 MG/DL
VLDLC SERPL CALC-MCNC: NORMAL MG/DL (ref 1–30)

## 2019-04-23 PROCEDURE — 36415 COLL VENOUS BLD VENIPUNCTURE: CPT

## 2019-04-23 PROCEDURE — 77080 DXA BONE DENSITY AXIAL: CPT

## 2019-04-23 PROCEDURE — 80053 COMPREHEN METABOLIC PANEL: CPT

## 2019-04-23 PROCEDURE — 80061 LIPID PANEL: CPT

## 2019-04-30 ENCOUNTER — OFFICE VISIT (OUTPATIENT)
Dept: INTERNAL MEDICINE | Age: 84
End: 2019-04-30
Payer: MEDICARE

## 2019-04-30 VITALS
RESPIRATION RATE: 16 BRPM | SYSTOLIC BLOOD PRESSURE: 110 MMHG | HEIGHT: 59 IN | BODY MASS INDEX: 24.39 KG/M2 | DIASTOLIC BLOOD PRESSURE: 80 MMHG | WEIGHT: 121 LBS | HEART RATE: 56 BPM

## 2019-04-30 DIAGNOSIS — I10 ESSENTIAL HYPERTENSION: Primary | ICD-10-CM

## 2019-04-30 DIAGNOSIS — M54.9 BACK PAIN, UNSPECIFIED BACK LOCATION, UNSPECIFIED BACK PAIN LATERALITY, UNSPECIFIED CHRONICITY: ICD-10-CM

## 2019-04-30 DIAGNOSIS — E78.5 HYPERLIPIDEMIA, UNSPECIFIED HYPERLIPIDEMIA TYPE: ICD-10-CM

## 2019-04-30 PROCEDURE — G8420 CALC BMI NORM PARAMETERS: HCPCS | Performed by: INTERNAL MEDICINE

## 2019-04-30 PROCEDURE — 4040F PNEUMOC VAC/ADMIN/RCVD: CPT | Performed by: INTERNAL MEDICINE

## 2019-04-30 PROCEDURE — 1036F TOBACCO NON-USER: CPT | Performed by: INTERNAL MEDICINE

## 2019-04-30 PROCEDURE — 1090F PRES/ABSN URINE INCON ASSESS: CPT | Performed by: INTERNAL MEDICINE

## 2019-04-30 PROCEDURE — 99214 OFFICE O/P EST MOD 30 MIN: CPT | Performed by: INTERNAL MEDICINE

## 2019-04-30 PROCEDURE — 1123F ACP DISCUSS/DSCN MKR DOCD: CPT | Performed by: INTERNAL MEDICINE

## 2019-04-30 PROCEDURE — G8427 DOCREV CUR MEDS BY ELIG CLIN: HCPCS | Performed by: INTERNAL MEDICINE

## 2019-04-30 RX ORDER — IBUPROFEN 200 MG
200 TABLET ORAL EVERY 8 HOURS PRN
Status: ON HOLD | COMMUNITY
End: 2021-01-01 | Stop reason: HOSPADM

## 2019-04-30 ASSESSMENT — ENCOUNTER SYMPTOMS
DIARRHEA: 0
NAUSEA: 0
VOMITING: 0
CONSTIPATION: 0
COUGH: 0
ABDOMINAL PAIN: 0
SHORTNESS OF BREATH: 0
BACK PAIN: 1
EYE PAIN: 0
BLOOD IN STOOL: 0

## 2019-04-30 ASSESSMENT — PATIENT HEALTH QUESTIONNAIRE - PHQ9
2. FEELING DOWN, DEPRESSED OR HOPELESS: 0
SUM OF ALL RESPONSES TO PHQ9 QUESTIONS 1 & 2: 0
SUM OF ALL RESPONSES TO PHQ QUESTIONS 1-9: 0
1. LITTLE INTEREST OR PLEASURE IN DOING THINGS: 0
SUM OF ALL RESPONSES TO PHQ QUESTIONS 1-9: 0

## 2019-04-30 NOTE — PROGRESS NOTES
[facility]  [address] [address] [address]  Dept: [phone]  Dept Fax: [phone]  Loc: [phone]     [facility] Street is a 80 y.o. female who presents today for her medical conditions/complaintsas noted below. [address] [place] [address] is c/o of   Chief Complaint   Patient presents with    Hypertension     6 month appt    Hyperlipidemia    Back Pain         HPI:     Hypertension   This is a chronic (essential hypertension) problem. The current episode started more than 1 year ago. The problem has been waxing and waning since onset. The problem is controlled. Pertinent negatives include no chest pain, headaches, neck pain, palpitations or shortness of breath. Hyperlipidemia   This is a chronic problem. The current episode started more than 1 year ago. The problem is controlled. Recent lipid tests were reviewed and are variable. Pertinent negatives include no chest pain or shortness of breath. Back Pain   This is a recurrent problem. The current episode started more than 1 year ago. The problem occurs intermittently. The problem has been waxing and waning since onset. Pertinent negatives include no abdominal pain, chest pain, dysuria, fever, headaches, numbness or weakness.        No results found for: LABA1C         No results found for: LABMICR  LDL Cholesterol (mg/dL)   Date Value   04/23/2019 44   04/18/2018 42   04/11/2017 51         AST (U/L)   Date Value   04/23/2019 23     ALT (U/L)   Date Value   04/23/2019 16     BUN (mg/dL)   Date Value   04/23/2019 16     BP Readings from Last 3 Encounters:   04/30/19 110/80   10/30/18 126/82   04/25/18 138/80              Past Medical History:   Diagnosis Date    Hyperlipidemia     Hypertension     Impaired fasting glucose     Macular degeneration     Osteopenia     Urinary urgency       Past Surgical History:   Procedure Laterality Date    CATARACT REMOVAL WITH IMPLANT Left     COLONOSCOPY  11/2006    severe diverticulosis       Family History   Problem Relation Age of Onset    Cancer Mother         leukemia    Heart Disease Father     Heart Attack Father     Cancer Brother         unknown          Social History     Tobacco Use    Smoking status: Never Smoker    Smokeless tobacco: Never Used   Substance Use Topics    Alcohol use: No         Current Outpatient Medications   Medication Sig Dispense Refill    ibuprofen (ADVIL;MOTRIN) 200 MG tablet Take 200 mg by mouth every morning      atorvastatin (LIPITOR) 10 MG tablet TAKE ONE TABLET BY MOUTH ONCE DAILY 90 tablet 3    Handicap Placard MISC by Does not apply route Exp 10/30/2021 1 each 0    metoprolol tartrate (LOPRESSOR) 25 MG tablet TAKE 1 TABLET BY MOUTH TWICE DAILY 180 tablet 3    Cholecalciferol (VITAMIN D3) 2000 units CAPS Take 2,000 Units by mouth daily      Timolol (TIMOPTIC) 0.5 % (DAILY) SOLN ophthalmic solution Place 1 drop into both eyes daily      latanoprost (XALATAN) 0.005 % ophthalmic solution Place 1 drop into both eyes nightly      Multiple Vitamins-Minerals (PRESERVISION AREDS) CAPS Take  by mouth 2 times daily.  Calcium 1500 MG TABS daily.  aspirin 81 MG tablet Take 81 mg by mouth daily.  Acetaminophen (TYLENOL EXTRA STRENGTH PO) Take  by mouth as needed. No current facility-administered medications for this visit. Allergies   Allergen Reactions    Macrobid [Nitrofurantoin] Rash    Sulfa Antibiotics Rash       Health Maintenance   Topic Date Due    DTaP/Tdap/Td vaccine (1 - Tdap) 04/30/2020 (Originally 1/26/1946)    Shingles Vaccine (1 of 2) 04/30/2020 (Originally 1/26/1977)    Flu vaccine  Completed    Pneumococcal 65+ years Vaccine  Completed       Subjective:      Review of Systems   Constitutional: Negative for chills and fever. HENT: Negative for hearing loss. Eyes: Negative for pain and visual disturbance. Respiratory: Negative for cough and shortness of breath.     Cardiovascular: Negative for chest pain, palpitations and leg swelling. Gastrointestinal: Negative for abdominal pain, blood in stool, constipation, diarrhea, nausea and vomiting. Endocrine: Negative for cold intolerance, polydipsia and polyuria. Genitourinary: Negative for difficulty urinating, dysuria and hematuria. Musculoskeletal: Positive for back pain. Negative for arthralgias, gait problem and neck pain. Skin: Negative for pallor and rash. Neurological: Negative for dizziness, weakness, numbness and headaches. Hematological: Negative for adenopathy. Does not bruise/bleed easily. Psychiatric/Behavioral: Negative for confusion. The patient is not nervous/anxious. Objective:     Physical Exam   Constitutional: She is oriented to person, place, and time. She appears well-developed and well-nourished. HENT:   Head: Normocephalic and atraumatic. Eyes: Pupils are equal, round, and reactive to light. EOM are normal.   Neck: Neck supple. Cardiovascular: Normal rate and regular rhythm. Exam reveals no gallop and no friction rub. No murmur heard. Pulmonary/Chest: Effort normal and breath sounds normal. She has no wheezes. She has no rales. Abdominal: Soft. She exhibits no distension and no mass. There is no tenderness. There is no rebound. Musculoskeletal: Normal range of motion. She exhibits no edema. Lymphadenopathy:     She has no cervical adenopathy. Neurological: She is alert and oriented to person, place, and time. No cranial nerve deficit (grossly). Skin: Skin is warm and dry. No rash noted. Psychiatric: She has a normal mood and affect. Thought content normal.   Vitals reviewed. /80 (Site: Left Upper Arm, Position: Sitting, Cuff Size: Medium Adult)   Pulse 56   Resp 16   Ht 4' 11\" (1.499 m)   Wt 121 lb (54.9 kg)   BMI 24.44 kg/m²     Assessment:       Diagnosis Orders   1. Essential hypertension     2. Hyperlipidemia, unspecified hyperlipidemia type     3.  Back pain, unspecified

## 2019-04-30 NOTE — PROGRESS NOTES
Hailee received counseling on the following healthy behaviors: medication adherence  Reviewed prior labs and health maintenance  Continue current medications except where noted below, diet and exercise. Discussed use, benefit, and side effects of prescribed medications. Barriers to medication compliance addressed. Patient given educational materials - see patient instructions  Was a self-tracking handout given in paper form or via [employer]? No    Requested Prescriptions      No prescriptions requested or ordered in this encounter       All patient questions answered. Patient voiced understanding. Quality Measures    Body mass index is 24.44 kg/m². Normal. Weight control planned discussed: healthy diet and regular exercise. BP: 110/80. Blood pressure is normal. Treatment plan consists of: see progress note below. Fall Risk 4/30/2019 10/30/2018 4/25/2018 4/24/2017 10/15/2015 10/9/2014 9/17/2014   2 or more falls in past year? no - no no no no no   Fall with injury in past year? no no no no no no no     The patient does not have a history of falls. I did , complete a risk assessment for falls.  A plan of care for falls home safety tips provided    Lab Results   Component Value Date    LDLCHOLESTEROL 44 04/23/2019    (goal LDL reduction with dx if diabetes is 50% LDL reduction)    PHQ Scores 4/30/2019 10/30/2018 4/25/2018 4/24/2017 10/15/2015 10/9/2014   PHQ2 Score 0 0 0 0 0 0   PHQ9 Score 0 0 0 0 0 0     Interpretation of Total Score Depression Severity: 1-4 = Minimal depression, 5-9 = Mild depression, 10-14 = Moderate depression, 15-19 = Moderately severe depression, 20-27 = Severe depression

## 2019-04-30 NOTE — PATIENT INSTRUCTIONS
Patient Education        Preventing Falls: Care Instructions  Your Care Instructions    Getting around your home safely can be a challenge if you have injuries or health problems that make it easy for you to fall. Loose rugs and furniture in walkways are among the dangers for many older people who have problems walking or who have poor eyesight. People who have conditions such as arthritis, osteoporosis, or dementia also have to be careful not to fall. You can make your home safer with a few simple measures. Follow-up care is a key part of your treatment and safety. Be sure to make and go to all appointments, and call your doctor if you are having problems. It's also a good idea to know your test results and keep a list of the medicines you take. How can you care for yourself at home? Taking care of yourself  · You may get dizzy if you do not drink enough water. To prevent dehydration, drink plenty of fluids, enough so that your urine is light yellow or clear like water. Choose water and other caffeine-free clear liquids. If you have kidney, heart, or liver disease and have to limit fluids, talk with your doctor before you increase the amount of fluids you drink. · Exercise regularly to improve your strength, muscle tone, and balance. Walk if you can. Swimming may be a good choice if you cannot walk easily. · Have your vision and hearing checked each year or any time you notice a change. If you have trouble seeing and hearing, you might not be able to avoid objects and could lose your balance. · Know the side effects of the medicines you take. Ask your doctor or pharmacist whether the medicines you take can affect your balance. Sleeping pills or sedatives can affect your balance. · Limit the amount of alcohol you drink. Alcohol can impair your balance and other senses. · Ask your doctor whether calluses or corns on your feet need to be removed.  If you wear loose-fitting shoes because of calluses or corns, that will allow you to sit while showering. · Get into a tub or shower by putting the weaker leg in first. Get out of a tub or shower with your strong side first.  · Repair loose toilet seats and consider installing a raised toilet seat to make getting on and off the toilet easier. · Keep your bathroom door unlocked while you are in the shower. Where can you learn more? Go to https://[employer]. org and sign in to your Close account. Enter [phone] in the [employer] box to learn more about \"Preventing Falls: Care Instructions. \"     If you do not have an account, please click on the \"Sign Up Now\" link. Current as of: March 15, 2018  Content Version: 11.9  © 7469-5799 [employer]. Care instructions adapted under license by [facility] ([facility]). If you have questions about a medical condition or this instruction, always ask your healthcare professional. [place] [address] any warranty or liability for your use of this information.

## 2019-07-12 ENCOUNTER — TELEPHONE (OUTPATIENT)
Dept: INTERNAL MEDICINE | Age: 84
End: 2019-07-12

## 2019-07-12 ENCOUNTER — HOSPITAL ENCOUNTER (OUTPATIENT)
Dept: LAB | Age: 84
Discharge: HOME | End: 2019-07-12
Payer: MEDICARE

## 2019-07-12 DIAGNOSIS — N39.0 URINARY TRACT INFECTION WITHOUT HEMATURIA, SITE UNSPECIFIED: Primary | ICD-10-CM

## 2019-07-12 DIAGNOSIS — R30.0 BURNING WITH URINATION: Primary | ICD-10-CM

## 2019-07-12 DIAGNOSIS — M54.5 LOW BACK PAIN, UNSPECIFIED BACK PAIN LATERALITY, UNSPECIFIED CHRONICITY, WITH SCIATICA PRESENCE UNSPECIFIED: ICD-10-CM

## 2019-07-12 DIAGNOSIS — R30.0 BURNING WITH URINATION: ICD-10-CM

## 2019-07-12 LAB
-: ABNORMAL
-: NORMAL
AMORPHOUS: ABNORMAL
BACTERIA: ABNORMAL
BILIRUBIN URINE: NEGATIVE
CASTS UA: ABNORMAL /LPF (ref 0–2)
COLOR: ABNORMAL
COMMENT UA: ABNORMAL
CRYSTALS, UA: ABNORMAL /HPF
EPITHELIAL CELLS UA: ABNORMAL /HPF (ref 0–5)
GLUCOSE URINE: NEGATIVE
KETONES, URINE: NEGATIVE
LEUKOCYTE ESTERASE, URINE: ABNORMAL
MUCUS: ABNORMAL
NITRITE, URINE: NEGATIVE
OTHER OBSERVATIONS UA: ABNORMAL
PH UA: 6 (ref 5–6)
PROTEIN UA: ABNORMAL
RBC UA: ABNORMAL /HPF (ref 0–4)
REASON FOR REJECTION: NORMAL
RENAL EPITHELIAL, UA: ABNORMAL /HPF
SPECIFIC GRAVITY UA: 1.01 (ref 1.01–1.02)
TRICHOMONAS: ABNORMAL
TURBIDITY: ABNORMAL
URINE HGB: ABNORMAL
UROBILINOGEN, URINE: NORMAL
WBC UA: >100 /HPF (ref 0–4)
YEAST: ABNORMAL
ZZ NTE CLEAN UP: ORDERED TEST: NORMAL
ZZ NTE WITH NAME CLEAN UP: SPECIMEN SOURCE: NORMAL

## 2019-07-12 PROCEDURE — 81001 URINALYSIS AUTO W/SCOPE: CPT

## 2019-07-12 PROCEDURE — 87186 SC STD MICRODIL/AGAR DIL: CPT

## 2019-07-12 PROCEDURE — 87086 URINE CULTURE/COLONY COUNT: CPT

## 2019-07-12 PROCEDURE — 87088 URINE BACTERIA CULTURE: CPT

## 2019-07-12 RX ORDER — CEPHALEXIN 500 MG/1
500 CAPSULE ORAL 2 TIMES DAILY
Qty: 14 CAPSULE | Refills: 0 | Status: SHIPPED | OUTPATIENT
Start: 2019-07-12 | End: 2019-07-19

## 2019-07-14 LAB
CULTURE: ABNORMAL
Lab: ABNORMAL
SPECIMEN DESCRIPTION: ABNORMAL

## 2019-09-18 ENCOUNTER — OFFICE VISIT (OUTPATIENT)
Dept: INTERNAL MEDICINE | Age: 84
End: 2019-09-18
Payer: MEDICARE

## 2019-09-18 VITALS
WEIGHT: 122.8 LBS | HEART RATE: 72 BPM | HEIGHT: 59 IN | SYSTOLIC BLOOD PRESSURE: 114 MMHG | BODY MASS INDEX: 24.76 KG/M2 | DIASTOLIC BLOOD PRESSURE: 62 MMHG

## 2019-09-18 DIAGNOSIS — M25.512 ACUTE PAIN OF LEFT SHOULDER: Primary | ICD-10-CM

## 2019-09-18 PROCEDURE — 4040F PNEUMOC VAC/ADMIN/RCVD: CPT | Performed by: NURSE PRACTITIONER

## 2019-09-18 PROCEDURE — G8420 CALC BMI NORM PARAMETERS: HCPCS | Performed by: NURSE PRACTITIONER

## 2019-09-18 PROCEDURE — 1090F PRES/ABSN URINE INCON ASSESS: CPT | Performed by: NURSE PRACTITIONER

## 2019-09-18 PROCEDURE — G8427 DOCREV CUR MEDS BY ELIG CLIN: HCPCS | Performed by: NURSE PRACTITIONER

## 2019-09-18 PROCEDURE — 99213 OFFICE O/P EST LOW 20 MIN: CPT | Performed by: NURSE PRACTITIONER

## 2019-09-18 PROCEDURE — 1036F TOBACCO NON-USER: CPT | Performed by: NURSE PRACTITIONER

## 2019-09-18 PROCEDURE — 1123F ACP DISCUSS/DSCN MKR DOCD: CPT | Performed by: NURSE PRACTITIONER

## 2019-09-18 RX ORDER — TIMOLOL MALEATE 5 MG/ML
1 SOLUTION/ DROPS OPHTHALMIC EVERY MORNING
Refills: 6 | COMMUNITY
Start: 2019-09-14

## 2019-09-18 RX ORDER — PREDNISONE 20 MG/1
20 TABLET ORAL 2 TIMES DAILY
Qty: 10 TABLET | Refills: 0 | Status: SHIPPED | OUTPATIENT
Start: 2019-09-18 | End: 2019-09-23

## 2019-09-29 ENCOUNTER — HOSPITAL ENCOUNTER (OUTPATIENT)
Age: 84
Discharge: HOME | End: 2019-09-29
Payer: MEDICARE

## 2019-09-29 ENCOUNTER — OFFICE VISIT (OUTPATIENT)
Dept: PRIMARY CARE CLINIC | Age: 84
End: 2019-09-29
Payer: MEDICARE

## 2019-09-29 VITALS
DIASTOLIC BLOOD PRESSURE: 76 MMHG | WEIGHT: 123 LBS | OXYGEN SATURATION: 95 % | TEMPERATURE: 99.2 F | HEART RATE: 76 BPM | BODY MASS INDEX: 24.8 KG/M2 | RESPIRATION RATE: 16 BRPM | HEIGHT: 59 IN | SYSTOLIC BLOOD PRESSURE: 136 MMHG

## 2019-09-29 DIAGNOSIS — R30.0 BURNING WITH URINATION: ICD-10-CM

## 2019-09-29 DIAGNOSIS — N30.01 ACUTE CYSTITIS WITH HEMATURIA: Primary | ICD-10-CM

## 2019-09-29 LAB
-: ABNORMAL
AMORPHOUS: ABNORMAL
BACTERIA: ABNORMAL
BILIRUBIN URINE: NEGATIVE
CASTS UA: ABNORMAL /LPF (ref 0–2)
COLOR: ABNORMAL
COMMENT UA: ABNORMAL
CRYSTALS, UA: ABNORMAL /HPF
EPITHELIAL CELLS UA: ABNORMAL /HPF (ref 0–5)
GLUCOSE URINE: NEGATIVE
KETONES, URINE: NEGATIVE
LEUKOCYTE ESTERASE, URINE: ABNORMAL
MUCUS: ABNORMAL
NITRITE, URINE: POSITIVE
OTHER OBSERVATIONS UA: ABNORMAL
PH UA: 6.5 (ref 5–6)
PROTEIN UA: ABNORMAL
RBC UA: ABNORMAL /HPF (ref 0–4)
RENAL EPITHELIAL, UA: ABNORMAL /HPF
SPECIFIC GRAVITY UA: 1.01 (ref 1.01–1.02)
TRICHOMONAS: ABNORMAL
TURBIDITY: ABNORMAL
URINE HGB: ABNORMAL
UROBILINOGEN, URINE: NORMAL
WBC UA: >100 /HPF (ref 0–4)
YEAST: ABNORMAL

## 2019-09-29 PROCEDURE — 86403 PARTICLE AGGLUT ANTBDY SCRN: CPT

## 2019-09-29 PROCEDURE — 1036F TOBACCO NON-USER: CPT | Performed by: FAMILY MEDICINE

## 2019-09-29 PROCEDURE — 4040F PNEUMOC VAC/ADMIN/RCVD: CPT | Performed by: FAMILY MEDICINE

## 2019-09-29 PROCEDURE — G8427 DOCREV CUR MEDS BY ELIG CLIN: HCPCS | Performed by: FAMILY MEDICINE

## 2019-09-29 PROCEDURE — 1123F ACP DISCUSS/DSCN MKR DOCD: CPT | Performed by: FAMILY MEDICINE

## 2019-09-29 PROCEDURE — 87186 SC STD MICRODIL/AGAR DIL: CPT

## 2019-09-29 PROCEDURE — 87086 URINE CULTURE/COLONY COUNT: CPT

## 2019-09-29 PROCEDURE — 87088 URINE BACTERIA CULTURE: CPT

## 2019-09-29 PROCEDURE — 81001 URINALYSIS AUTO W/SCOPE: CPT

## 2019-09-29 PROCEDURE — 99203 OFFICE O/P NEW LOW 30 MIN: CPT | Performed by: FAMILY MEDICINE

## 2019-09-29 PROCEDURE — G8420 CALC BMI NORM PARAMETERS: HCPCS | Performed by: FAMILY MEDICINE

## 2019-09-29 PROCEDURE — 1090F PRES/ABSN URINE INCON ASSESS: CPT | Performed by: FAMILY MEDICINE

## 2019-09-29 RX ORDER — CEPHALEXIN 500 MG/1
500 CAPSULE ORAL 3 TIMES DAILY
Qty: 21 CAPSULE | Refills: 0 | Status: SHIPPED | OUTPATIENT
Start: 2019-09-29 | End: 2019-10-06

## 2019-09-29 ASSESSMENT — ENCOUNTER SYMPTOMS
COUGH: 0
BACK PAIN: 1
VOMITING: 0
SHORTNESS OF BREATH: 0
NAUSEA: 0

## 2019-09-29 NOTE — PROGRESS NOTES
worsen, if she develops fevers or severe back pain. she understood and agreed with the plan. Return if symptoms worsen or fail to improve. Orders Placed This Encounter   Procedures    Urinalysis Reflex to Culture     Standing Status:   Future     Number of Occurrences:   1     Standing Expiration Date:   9/29/2020     Order Specific Question:   SPECIFY(EX-CATH,MIDSTREAM,CYSTO,ETC)? Answer:   mid     Orders Placed This Encounter   Medications    cephALEXin (KEFLEX) 500 MG capsule     Sig: Take 1 capsule by mouth 3 times daily for 7 days     Dispense:  21 capsule     Refill:  0       Patientgiven educational materials - see patient instructions. Discussed use, benefit,and side effects of prescribed medications. All patient questions answered. Ptvoiced understanding. Reviewed health maintenance. Instructed to continue currentmedications, diet and exercise. Patient agreed with treatment plan. Follow up asdirected.      Electronically signed by Lucas Bey MD on 9/29/2019 at 4:54 PM

## 2019-10-01 LAB
CULTURE: ABNORMAL
CULTURE: ABNORMAL
Lab: ABNORMAL
SPECIMEN DESCRIPTION: ABNORMAL

## 2019-10-25 ENCOUNTER — IMMUNIZATION (OUTPATIENT)
Dept: LAB | Age: 84
End: 2019-10-25
Payer: MEDICARE

## 2019-10-25 DIAGNOSIS — Z23 NEED FOR VACCINATION: Primary | ICD-10-CM

## 2019-10-25 PROCEDURE — G0008 ADMIN INFLUENZA VIRUS VAC: HCPCS | Performed by: INTERNAL MEDICINE

## 2019-10-25 PROCEDURE — 90653 IIV ADJUVANT VACCINE IM: CPT | Performed by: INTERNAL MEDICINE

## 2019-11-04 ENCOUNTER — OFFICE VISIT (OUTPATIENT)
Dept: INTERNAL MEDICINE | Age: 84
End: 2019-11-04
Payer: MEDICARE

## 2019-11-04 VITALS
HEIGHT: 59 IN | HEART RATE: 80 BPM | DIASTOLIC BLOOD PRESSURE: 86 MMHG | WEIGHT: 119 LBS | BODY MASS INDEX: 23.99 KG/M2 | SYSTOLIC BLOOD PRESSURE: 132 MMHG | RESPIRATION RATE: 16 BRPM

## 2019-11-04 DIAGNOSIS — E78.5 HYPERLIPIDEMIA, UNSPECIFIED HYPERLIPIDEMIA TYPE: ICD-10-CM

## 2019-11-04 DIAGNOSIS — Z13.1 SPECIAL SCREENING EXAMINATION FOR DIABETES MELLITUS: ICD-10-CM

## 2019-11-04 DIAGNOSIS — I10 ESSENTIAL HYPERTENSION: ICD-10-CM

## 2019-11-04 DIAGNOSIS — Z00.00 MEDICARE ANNUAL WELLNESS VISIT, SUBSEQUENT: ICD-10-CM

## 2019-11-04 DIAGNOSIS — Z00.00 ROUTINE GENERAL MEDICAL EXAMINATION AT A HEALTH CARE FACILITY: Primary | ICD-10-CM

## 2019-11-04 DIAGNOSIS — M25.512 LEFT SHOULDER PAIN, UNSPECIFIED CHRONICITY: ICD-10-CM

## 2019-11-04 PROCEDURE — G8482 FLU IMMUNIZE ORDER/ADMIN: HCPCS | Performed by: INTERNAL MEDICINE

## 2019-11-04 PROCEDURE — 4040F PNEUMOC VAC/ADMIN/RCVD: CPT | Performed by: INTERNAL MEDICINE

## 2019-11-04 PROCEDURE — 1036F TOBACCO NON-USER: CPT | Performed by: INTERNAL MEDICINE

## 2019-11-04 PROCEDURE — G8420 CALC BMI NORM PARAMETERS: HCPCS | Performed by: INTERNAL MEDICINE

## 2019-11-04 PROCEDURE — 99214 OFFICE O/P EST MOD 30 MIN: CPT | Performed by: INTERNAL MEDICINE

## 2019-11-04 PROCEDURE — G8427 DOCREV CUR MEDS BY ELIG CLIN: HCPCS | Performed by: INTERNAL MEDICINE

## 2019-11-04 PROCEDURE — 1090F PRES/ABSN URINE INCON ASSESS: CPT | Performed by: INTERNAL MEDICINE

## 2019-11-04 PROCEDURE — G0439 PPPS, SUBSEQ VISIT: HCPCS | Performed by: INTERNAL MEDICINE

## 2019-11-04 PROCEDURE — 1123F ACP DISCUSS/DSCN MKR DOCD: CPT | Performed by: INTERNAL MEDICINE

## 2019-11-04 RX ORDER — ATORVASTATIN CALCIUM 10 MG/1
TABLET, FILM COATED ORAL
Qty: 90 TABLET | Refills: 3 | Status: SHIPPED | OUTPATIENT
Start: 2019-11-04

## 2019-11-04 ASSESSMENT — ENCOUNTER SYMPTOMS
COUGH: 0
BACK PAIN: 0
SHORTNESS OF BREATH: 0
BLOOD IN STOOL: 0
CONSTIPATION: 0
VOMITING: 0
ABDOMINAL PAIN: 0
NAUSEA: 0
DIARRHEA: 0
EYE PAIN: 0

## 2019-11-04 ASSESSMENT — LIFESTYLE VARIABLES: HOW OFTEN DO YOU HAVE A DRINK CONTAINING ALCOHOL: 0

## 2019-11-04 ASSESSMENT — PATIENT HEALTH QUESTIONNAIRE - PHQ9
SUM OF ALL RESPONSES TO PHQ QUESTIONS 1-9: 0
SUM OF ALL RESPONSES TO PHQ QUESTIONS 1-9: 0

## 2020-01-01 ENCOUNTER — NURSE ONLY (OUTPATIENT)
Dept: SURGERY | Age: 85
End: 2020-01-01
Payer: MEDICARE

## 2020-01-01 ENCOUNTER — TELEPHONE (OUTPATIENT)
Dept: PRIMARY CARE CLINIC | Age: 85
End: 2020-01-01

## 2020-01-01 ENCOUNTER — TELEPHONE (OUTPATIENT)
Dept: INTERNAL MEDICINE | Age: 85
End: 2020-01-01

## 2020-01-01 ENCOUNTER — OFFICE VISIT (OUTPATIENT)
Dept: ONCOLOGY | Age: 85
End: 2020-01-01
Payer: MEDICARE

## 2020-01-01 ENCOUNTER — OFFICE VISIT (OUTPATIENT)
Dept: SURGERY | Age: 85
End: 2020-01-01
Payer: MEDICARE

## 2020-01-01 ENCOUNTER — INITIAL CONSULT (OUTPATIENT)
Dept: SURGERY | Age: 85
End: 2020-01-01
Payer: MEDICARE

## 2020-01-01 ENCOUNTER — HOSPITAL ENCOUNTER (OUTPATIENT)
Dept: GENERAL RADIOLOGY | Age: 85
Discharge: HOME | End: 2020-06-18
Payer: MEDICARE

## 2020-01-01 ENCOUNTER — OFFICE VISIT (OUTPATIENT)
Dept: PRIMARY CARE CLINIC | Age: 85
End: 2020-01-01
Payer: MEDICARE

## 2020-01-01 ENCOUNTER — OFFICE VISIT (OUTPATIENT)
Dept: INTERNAL MEDICINE | Age: 85
End: 2020-01-01
Payer: MEDICARE

## 2020-01-01 ENCOUNTER — HOSPITAL ENCOUNTER (OUTPATIENT)
Dept: LAB | Age: 85
Discharge: HOME | End: 2020-11-12
Payer: MEDICARE

## 2020-01-01 ENCOUNTER — HOSPITAL ENCOUNTER (OUTPATIENT)
Dept: LAB | Age: 85
Discharge: HOME | End: 2020-05-04
Payer: MEDICARE

## 2020-01-01 ENCOUNTER — HOSPITAL ENCOUNTER (OUTPATIENT)
Dept: LAB | Age: 85
Discharge: HOME | End: 2020-11-17
Payer: MEDICARE

## 2020-01-01 ENCOUNTER — HOSPITAL ENCOUNTER (OUTPATIENT)
Dept: GENERAL RADIOLOGY | Age: 85
Discharge: HOME | End: 2020-05-28
Payer: MEDICARE

## 2020-01-01 ENCOUNTER — OFFICE VISIT (OUTPATIENT)
Dept: FAMILY MEDICINE CLINIC | Age: 85
End: 2020-01-01
Payer: MEDICARE

## 2020-01-01 ENCOUNTER — HOSPITAL ENCOUNTER (OUTPATIENT)
Dept: LAB | Age: 85
Discharge: HOME | End: 2020-08-04
Payer: MEDICARE

## 2020-01-01 ENCOUNTER — IMMUNIZATION (OUTPATIENT)
Dept: LAB | Age: 85
End: 2020-01-01
Payer: MEDICARE

## 2020-01-01 ENCOUNTER — OFFICE VISIT (OUTPATIENT)
Dept: ORTHOPEDIC SURGERY | Age: 85
End: 2020-01-01
Payer: MEDICARE

## 2020-01-01 ENCOUNTER — HOSPITAL ENCOUNTER (OUTPATIENT)
Dept: WOUND CARE | Age: 85
Discharge: HOME | End: 2020-12-23
Payer: MEDICARE

## 2020-01-01 ENCOUNTER — HOSPITAL ENCOUNTER (OUTPATIENT)
Dept: WOUND CARE | Age: 85
Discharge: HOME | End: 2020-12-09
Payer: MEDICARE

## 2020-01-01 ENCOUNTER — HOSPITAL ENCOUNTER (OUTPATIENT)
Dept: LAB | Age: 85
Discharge: HOME | End: 2020-12-04
Payer: MEDICARE

## 2020-01-01 ENCOUNTER — HOSPITAL ENCOUNTER (OUTPATIENT)
Dept: WOUND CARE | Age: 85
Discharge: HOME | End: 2020-11-25
Payer: MEDICARE

## 2020-01-01 ENCOUNTER — HOSPITAL ENCOUNTER (OUTPATIENT)
Dept: GENERAL RADIOLOGY | Age: 85
Discharge: HOME | End: 2020-05-06
Payer: MEDICARE

## 2020-01-01 ENCOUNTER — HOSPITAL ENCOUNTER (OUTPATIENT)
Dept: WOUND CARE | Age: 85
Discharge: HOME | End: 2020-11-18
Payer: MEDICARE

## 2020-01-01 ENCOUNTER — HOSPITAL ENCOUNTER (OUTPATIENT)
Dept: LAB | Age: 85
Discharge: HOME | End: 2020-05-18
Payer: MEDICARE

## 2020-01-01 ENCOUNTER — HOSPITAL ENCOUNTER (OUTPATIENT)
Age: 85
Setting detail: SPECIMEN
Discharge: HOME | End: 2020-11-06
Payer: MEDICARE

## 2020-01-01 ENCOUNTER — TELEPHONE (OUTPATIENT)
Dept: SURGERY | Age: 85
End: 2020-01-01

## 2020-01-01 ENCOUNTER — HOSPITAL ENCOUNTER (OUTPATIENT)
Dept: LAB | Age: 85
Discharge: HOME | End: 2020-06-01
Payer: MEDICARE

## 2020-01-01 ENCOUNTER — HOSPITAL ENCOUNTER (OUTPATIENT)
Dept: GENERAL RADIOLOGY | Age: 85
Discharge: HOME | End: 2020-11-07
Payer: MEDICARE

## 2020-01-01 ENCOUNTER — TELEPHONE (OUTPATIENT)
Dept: ONCOLOGY | Age: 85
End: 2020-01-01

## 2020-01-01 VITALS
SYSTOLIC BLOOD PRESSURE: 138 MMHG | HEART RATE: 57 BPM | DIASTOLIC BLOOD PRESSURE: 74 MMHG | BODY MASS INDEX: 23.05 KG/M2 | HEIGHT: 60 IN

## 2020-01-01 VITALS
SYSTOLIC BLOOD PRESSURE: 136 MMHG | BODY MASS INDEX: 24.6 KG/M2 | OXYGEN SATURATION: 97 % | WEIGHT: 122 LBS | HEART RATE: 61 BPM | RESPIRATION RATE: 16 BRPM | HEIGHT: 59 IN | TEMPERATURE: 96.6 F | DIASTOLIC BLOOD PRESSURE: 88 MMHG

## 2020-01-01 VITALS
WEIGHT: 123 LBS | HEIGHT: 58 IN | HEART RATE: 73 BPM | BODY MASS INDEX: 25.82 KG/M2 | DIASTOLIC BLOOD PRESSURE: 72 MMHG | SYSTOLIC BLOOD PRESSURE: 112 MMHG | RESPIRATION RATE: 16 BRPM

## 2020-01-01 VITALS
HEIGHT: 59 IN | BODY MASS INDEX: 24.6 KG/M2 | SYSTOLIC BLOOD PRESSURE: 120 MMHG | OXYGEN SATURATION: 98 % | HEART RATE: 70 BPM | DIASTOLIC BLOOD PRESSURE: 68 MMHG | RESPIRATION RATE: 18 BRPM | WEIGHT: 122 LBS | TEMPERATURE: 96.7 F

## 2020-01-01 VITALS
RESPIRATION RATE: 16 BRPM | HEIGHT: 59 IN | OXYGEN SATURATION: 91 % | TEMPERATURE: 98.2 F | WEIGHT: 118 LBS | HEART RATE: 72 BPM | DIASTOLIC BLOOD PRESSURE: 80 MMHG | SYSTOLIC BLOOD PRESSURE: 130 MMHG | BODY MASS INDEX: 23.79 KG/M2

## 2020-01-01 VITALS
HEART RATE: 72 BPM | TEMPERATURE: 98.8 F | BODY MASS INDEX: 24.44 KG/M2 | DIASTOLIC BLOOD PRESSURE: 64 MMHG | WEIGHT: 121 LBS | SYSTOLIC BLOOD PRESSURE: 118 MMHG

## 2020-01-01 VITALS
SYSTOLIC BLOOD PRESSURE: 118 MMHG | RESPIRATION RATE: 16 BRPM | HEIGHT: 59 IN | BODY MASS INDEX: 23.79 KG/M2 | WEIGHT: 118 LBS | OXYGEN SATURATION: 92 % | DIASTOLIC BLOOD PRESSURE: 78 MMHG | HEART RATE: 72 BPM | TEMPERATURE: 98.2 F

## 2020-01-01 VITALS
RESPIRATION RATE: 24 BRPM | DIASTOLIC BLOOD PRESSURE: 92 MMHG | OXYGEN SATURATION: 93 % | HEIGHT: 60 IN | SYSTOLIC BLOOD PRESSURE: 142 MMHG | TEMPERATURE: 99.3 F | WEIGHT: 129.4 LBS | HEART RATE: 81 BPM | BODY MASS INDEX: 25.4 KG/M2

## 2020-01-01 VITALS
HEIGHT: 60 IN | DIASTOLIC BLOOD PRESSURE: 80 MMHG | SYSTOLIC BLOOD PRESSURE: 122 MMHG | WEIGHT: 122 LBS | RESPIRATION RATE: 14 BRPM | BODY MASS INDEX: 23.95 KG/M2 | HEART RATE: 55 BPM

## 2020-01-01 VITALS
HEIGHT: 60 IN | HEART RATE: 72 BPM | DIASTOLIC BLOOD PRESSURE: 90 MMHG | SYSTOLIC BLOOD PRESSURE: 130 MMHG | BODY MASS INDEX: 23.16 KG/M2 | WEIGHT: 118 LBS | RESPIRATION RATE: 16 BRPM

## 2020-01-01 VITALS
HEIGHT: 60 IN | BODY MASS INDEX: 23.16 KG/M2 | DIASTOLIC BLOOD PRESSURE: 62 MMHG | SYSTOLIC BLOOD PRESSURE: 118 MMHG | HEART RATE: 62 BPM | WEIGHT: 118 LBS | OXYGEN SATURATION: 94 %

## 2020-01-01 VITALS — WEIGHT: 122 LBS | HEIGHT: 59 IN | HEART RATE: 60 BPM | TEMPERATURE: 98.1 F | BODY MASS INDEX: 24.6 KG/M2

## 2020-01-01 VITALS
OXYGEN SATURATION: 97 % | TEMPERATURE: 97.4 F | BODY MASS INDEX: 23.16 KG/M2 | HEART RATE: 60 BPM | RESPIRATION RATE: 16 BRPM | DIASTOLIC BLOOD PRESSURE: 78 MMHG | SYSTOLIC BLOOD PRESSURE: 138 MMHG | WEIGHT: 118 LBS | HEIGHT: 60 IN

## 2020-01-01 VITALS
WEIGHT: 116.6 LBS | RESPIRATION RATE: 18 BRPM | TEMPERATURE: 97.9 F | BODY MASS INDEX: 24.48 KG/M2 | HEART RATE: 66 BPM | SYSTOLIC BLOOD PRESSURE: 100 MMHG | HEIGHT: 58 IN | DIASTOLIC BLOOD PRESSURE: 78 MMHG

## 2020-01-01 VITALS
HEIGHT: 60 IN | WEIGHT: 115 LBS | TEMPERATURE: 97.3 F | SYSTOLIC BLOOD PRESSURE: 138 MMHG | HEART RATE: 58 BPM | DIASTOLIC BLOOD PRESSURE: 64 MMHG | BODY MASS INDEX: 22.58 KG/M2 | RESPIRATION RATE: 20 BRPM

## 2020-01-01 VITALS
HEART RATE: 75 BPM | WEIGHT: 118.6 LBS | OXYGEN SATURATION: 98 % | SYSTOLIC BLOOD PRESSURE: 138 MMHG | BODY MASS INDEX: 23.91 KG/M2 | DIASTOLIC BLOOD PRESSURE: 82 MMHG | TEMPERATURE: 98 F | RESPIRATION RATE: 16 BRPM | HEIGHT: 59 IN

## 2020-01-01 VITALS
WEIGHT: 118 LBS | SYSTOLIC BLOOD PRESSURE: 148 MMHG | DIASTOLIC BLOOD PRESSURE: 82 MMHG | HEIGHT: 60 IN | HEART RATE: 60 BPM | BODY MASS INDEX: 23.16 KG/M2 | RESPIRATION RATE: 14 BRPM

## 2020-01-01 VITALS — TEMPERATURE: 98.8 F | SYSTOLIC BLOOD PRESSURE: 130 MMHG | DIASTOLIC BLOOD PRESSURE: 70 MMHG | HEART RATE: 70 BPM

## 2020-01-01 VITALS
WEIGHT: 118 LBS | SYSTOLIC BLOOD PRESSURE: 143 MMHG | BODY MASS INDEX: 23.16 KG/M2 | HEIGHT: 60 IN | DIASTOLIC BLOOD PRESSURE: 71 MMHG | HEART RATE: 58 BPM

## 2020-01-01 LAB
ABSOLUTE EOS #: 0.13 K/UL (ref 0–0.4)
ABSOLUTE EOS #: 0.33 K/UL (ref 0–0.4)
ABSOLUTE EOS #: 0.37 K/UL (ref 0–0.4)
ABSOLUTE EOS #: 0.48 K/UL (ref 0–0.4)
ABSOLUTE EOS #: 0.68 K/UL (ref 0–0.4)
ABSOLUTE EOS #: 1.06 K/UL (ref 0–0.4)
ABSOLUTE EOS #: 1.2 K/UL (ref 0–0.4)
ABSOLUTE IMMATURE GRANULOCYTE: 0 K/UL (ref 0–0.3)
ABSOLUTE IMMATURE GRANULOCYTE: 0.15 K/UL (ref 0–0.3)
ABSOLUTE LYMPH #: 4.76 K/UL (ref 1–4.8)
ABSOLUTE LYMPH #: 5.03 K/UL (ref 1–4.8)
ABSOLUTE LYMPH #: 6.15 K/UL (ref 1–4.8)
ABSOLUTE LYMPH #: 6.59 K/UL (ref 1–4.8)
ABSOLUTE LYMPH #: 7.75 K/UL (ref 1–4.8)
ABSOLUTE LYMPH #: 8.86 K/UL (ref 1–4.8)
ABSOLUTE LYMPH #: 9.5 K/UL (ref 1–4.8)
ABSOLUTE MONO #: 0.7 K/UL (ref 0.1–1.2)
ABSOLUTE MONO #: 1.13 K/UL (ref 0.1–1.2)
ABSOLUTE MONO #: 1.55 K/UL (ref 0.1–1.2)
ABSOLUTE MONO #: 1.64 K/UL (ref 0.1–1.2)
ABSOLUTE MONO #: 1.65 K/UL (ref 0.1–1.2)
ABSOLUTE MONO #: 1.83 K/UL (ref 0.1–1.2)
ABSOLUTE MONO #: 2.28 K/UL (ref 0.1–1.2)
ALBUMIN SERPL-MCNC: 3.7 G/DL (ref 3.5–5.2)
ALBUMIN/GLOBULIN RATIO: 1.2 (ref 1–2.5)
ALP BLD-CCNC: 102 U/L (ref 35–104)
ALT SERPL-CCNC: 21 U/L (ref 5–33)
ANION GAP SERPL CALCULATED.3IONS-SCNC: 8 MMOL/L (ref 9–17)
ANION GAP SERPL CALCULATED.3IONS-SCNC: 9 MMOL/L (ref 9–17)
AST SERPL-CCNC: 26 U/L
ATYPICAL LYMPHOCYTE ABSOLUTE COUNT: 0.16 K/UL
ATYPICAL LYMPHOCYTE ABSOLUTE COUNT: 0.16 K/UL
ATYPICAL LYMPHOCYTES: 1 %
ATYPICAL LYMPHOCYTES: 1 %
BASOPHILS # BLD: 0 % (ref 0–1)
BASOPHILS # BLD: 1 % (ref 0–1)
BASOPHILS # BLD: 2 % (ref 0–1)
BASOPHILS # BLD: 2 % (ref 0–1)
BASOPHILS ABSOLUTE: 0 K/UL (ref 0–0.2)
BASOPHILS ABSOLUTE: 0.13 K/UL (ref 0–0.2)
BASOPHILS ABSOLUTE: 0.3 K/UL (ref 0–0.2)
BASOPHILS ABSOLUTE: 0.37 K/UL (ref 0–0.2)
BILIRUB SERPL-MCNC: 0.91 MG/DL (ref 0.3–1.2)
BUN BLDV-MCNC: 14 MG/DL (ref 8–23)
BUN BLDV-MCNC: 25 MG/DL (ref 8–23)
BUN/CREAT BLD: 21 (ref 9–20)
BUN/CREAT BLD: 37 (ref 9–20)
CALCIUM SERPL-MCNC: 9.1 MG/DL (ref 8.6–10.4)
CALCIUM SERPL-MCNC: 9.6 MG/DL (ref 8.6–10.4)
CHLORIDE BLD-SCNC: 100 MMOL/L (ref 98–107)
CHLORIDE BLD-SCNC: 101 MMOL/L (ref 98–107)
CHOLESTEROL/HDL RATIO: 2.8
CHOLESTEROL: 86 MG/DL
CO2: 25 MMOL/L (ref 20–31)
CO2: 26 MMOL/L (ref 20–31)
CREAT SERPL-MCNC: 0.66 MG/DL (ref 0.5–0.9)
CREAT SERPL-MCNC: 0.68 MG/DL (ref 0.5–0.9)
DIFFERENTIAL TYPE: ABNORMAL
EOSINOPHILS RELATIVE PERCENT: 1 % (ref 1–7)
EOSINOPHILS RELATIVE PERCENT: 2 % (ref 1–7)
EOSINOPHILS RELATIVE PERCENT: 2 % (ref 1–7)
EOSINOPHILS RELATIVE PERCENT: 3 % (ref 1–7)
EOSINOPHILS RELATIVE PERCENT: 3 % (ref 1–7)
EOSINOPHILS RELATIVE PERCENT: 6 % (ref 1–7)
EOSINOPHILS RELATIVE PERCENT: 8 % (ref 1–7)
GFR AFRICAN AMERICAN: >60 ML/MIN
GFR AFRICAN AMERICAN: >60 ML/MIN
GFR NON-AFRICAN AMERICAN: >60 ML/MIN
GFR NON-AFRICAN AMERICAN: >60 ML/MIN
GFR SERPL CREATININE-BSD FRML MDRD: ABNORMAL ML/MIN/{1.73_M2}
GLUCOSE BLD-MCNC: 101 MG/DL (ref 70–99)
GLUCOSE BLD-MCNC: 117 MG/DL (ref 70–99)
HCT VFR BLD CALC: 26.3 % (ref 36.3–47.1)
HCT VFR BLD CALC: 27.8 % (ref 36.3–47.1)
HCT VFR BLD CALC: 29.8 % (ref 36.3–47.1)
HCT VFR BLD CALC: 31.8 % (ref 36.3–47.1)
HCT VFR BLD CALC: 32.6 % (ref 36.3–47.1)
HCT VFR BLD CALC: 33.2 % (ref 36.3–47.1)
HCT VFR BLD CALC: 36.7 % (ref 36.3–47.1)
HDLC SERPL-MCNC: 31 MG/DL
HEMOGLOBIN: 10 G/DL (ref 11.9–15.1)
HEMOGLOBIN: 10.3 G/DL (ref 11.9–15.1)
HEMOGLOBIN: 10.6 G/DL (ref 11.9–15.1)
HEMOGLOBIN: 11.9 G/DL (ref 11.9–15.1)
HEMOGLOBIN: 8.2 G/DL (ref 11.9–15.1)
HEMOGLOBIN: 8.4 G/DL (ref 11.9–15.1)
HEMOGLOBIN: 9 G/DL (ref 11.9–15.1)
IMMATURE GRANULOCYTES: 0 %
IMMATURE GRANULOCYTES: 1 %
INR BLD: 1.4
IRON SATURATION: 13 % (ref 20–55)
IRON SATURATION: 29 % (ref 20–55)
IRON SATURATION: 35 % (ref 20–55)
IRON: 101 UG/DL (ref 37–145)
IRON: 37 UG/DL (ref 37–145)
IRON: 79 UG/DL (ref 37–145)
LDL CHOLESTEROL: 35 MG/DL (ref 0–130)
LYMPHOCYTES # BLD: 26 % (ref 16–46)
LYMPHOCYTES # BLD: 34 % (ref 16–46)
LYMPHOCYTES # BLD: 39 % (ref 16–46)
LYMPHOCYTES # BLD: 41 % (ref 16–46)
LYMPHOCYTES # BLD: 41 % (ref 16–46)
LYMPHOCYTES # BLD: 54 % (ref 16–46)
LYMPHOCYTES # BLD: 54 % (ref 16–46)
MCH RBC QN AUTO: 29.8 PG (ref 25.2–33.5)
MCH RBC QN AUTO: 30.1 PG (ref 25.2–33.5)
MCH RBC QN AUTO: 30.4 PG (ref 25.2–33.5)
MCH RBC QN AUTO: 32.3 PG (ref 25.2–33.5)
MCH RBC QN AUTO: 32.5 PG (ref 25.2–33.5)
MCH RBC QN AUTO: 32.6 PG (ref 25.2–33.5)
MCH RBC QN AUTO: 32.8 PG (ref 25.2–33.5)
MCHC RBC AUTO-ENTMCNC: 30.2 G/DL (ref 25.2–33.5)
MCHC RBC AUTO-ENTMCNC: 30.2 G/DL (ref 25.2–33.5)
MCHC RBC AUTO-ENTMCNC: 31.2 G/DL (ref 25.2–33.5)
MCHC RBC AUTO-ENTMCNC: 31.4 G/DL (ref 25.2–33.5)
MCHC RBC AUTO-ENTMCNC: 31.6 G/DL (ref 25.2–33.5)
MCHC RBC AUTO-ENTMCNC: 31.9 G/DL (ref 25.2–33.5)
MCHC RBC AUTO-ENTMCNC: 32.4 G/DL (ref 25.2–33.5)
MCV RBC AUTO: 100.5 FL (ref 82.6–102.9)
MCV RBC AUTO: 100.7 FL (ref 82.6–102.9)
MCV RBC AUTO: 101.8 FL (ref 82.6–102.9)
MCV RBC AUTO: 102.2 FL (ref 82.6–102.9)
MCV RBC AUTO: 104.3 FL (ref 82.6–102.9)
MCV RBC AUTO: 95.6 FL (ref 82.6–102.9)
MCV RBC AUTO: 99.7 FL (ref 82.6–102.9)
MONOCYTES # BLD: 10 % (ref 4–11)
MONOCYTES # BLD: 11 % (ref 4–11)
MONOCYTES # BLD: 12 % (ref 4–11)
MONOCYTES # BLD: 4 % (ref 4–11)
MONOCYTES # BLD: 7 % (ref 4–11)
MORPHOLOGY: ABNORMAL
NRBC AUTOMATED: 0 PER 100 WBC
NRBC AUTOMATED: 0.2 PER 100 WBC
NRBC AUTOMATED: 0.2 PER 100 WBC
NRBC AUTOMATED: 0.3 PER 100 WBC
NRBC AUTOMATED: 0.4 PER 100 WBC
NRBC AUTOMATED: 0.4 PER 100 WBC
NRBC AUTOMATED: 0.5 PER 100 WBC
NUCLEATED RED BLOOD CELLS: 2 PER 100 WBC
PARTIAL THROMBOPLASTIN TIME: 36.4 SEC (ref 23.9–33.8)
PDW BLD-RTO: 18.7 % (ref 11.8–14.4)
PDW BLD-RTO: 18.9 % (ref 11.8–14.4)
PDW BLD-RTO: 19.5 % (ref 11.8–14.4)
PDW BLD-RTO: 19.6 % (ref 11.8–14.4)
PDW BLD-RTO: 24.1 % (ref 11.8–14.4)
PDW BLD-RTO: 24.2 % (ref 11.8–14.4)
PDW BLD-RTO: 24.5 % (ref 11.8–14.4)
PLATELET # BLD: ABNORMAL K/UL (ref 138–453)
PLATELET ESTIMATE: ABNORMAL
PLATELET, FLUORESCENCE: 1519 K/UL (ref 138–453)
PLATELET, FLUORESCENCE: 1646 K/UL (ref 138–453)
PLATELET, FLUORESCENCE: 1759 K/UL (ref 138–453)
PLATELET, FLUORESCENCE: 1793 K/UL (ref 138–453)
PLATELET, FLUORESCENCE: 1822 K/UL (ref 138–453)
PLATELET, FLUORESCENCE: 1961 K/UL (ref 138–453)
PLATELET, FLUORESCENCE: 1981 K/UL (ref 138–453)
PLATELET, IMMATURE FRACTION: 5.3 % (ref 1.1–10.3)
PLATELET, IMMATURE FRACTION: 5.6 % (ref 1.1–10.3)
PLATELET, IMMATURE FRACTION: 5.6 % (ref 1.1–10.3)
PLATELET, IMMATURE FRACTION: 6.5 % (ref 1.1–10.3)
PLATELET, IMMATURE FRACTION: 7.1 % (ref 1.1–10.3)
PLATELET, IMMATURE FRACTION: 7.7 % (ref 1.1–10.3)
PLATELET, IMMATURE FRACTION: 7.9 % (ref 1.1–10.3)
PMV BLD AUTO: ABNORMAL FL (ref 8.1–13.5)
POTASSIUM SERPL-SCNC: 3.9 MMOL/L (ref 3.7–5.3)
POTASSIUM SERPL-SCNC: 4.2 MMOL/L (ref 3.7–5.3)
PROTHROMBIN TIME: 16.6 SEC (ref 11.5–14.2)
RBC # BLD: 2.75 M/UL (ref 3.95–5.11)
RBC # BLD: 2.76 M/UL (ref 3.95–5.11)
RBC # BLD: 2.99 M/UL (ref 3.95–5.11)
RBC # BLD: 3.05 M/UL (ref 3.95–5.11)
RBC # BLD: 3.19 M/UL (ref 3.95–5.11)
RBC # BLD: 3.26 M/UL (ref 3.95–5.11)
RBC # BLD: 3.65 M/UL (ref 3.95–5.11)
RBC # BLD: ABNORMAL 10*6/UL
SARS-COV-2, NAA: NOT DETECTED
SEG NEUTROPHILS: 33 % (ref 43–77)
SEG NEUTROPHILS: 36 % (ref 43–77)
SEG NEUTROPHILS: 37 % (ref 43–77)
SEG NEUTROPHILS: 47 % (ref 43–77)
SEG NEUTROPHILS: 48 % (ref 43–77)
SEG NEUTROPHILS: 53 % (ref 43–77)
SEG NEUTROPHILS: 60 % (ref 43–77)
SEGMENTED NEUTROPHILS ABSOLUTE COUNT: 10.97 K/UL (ref 1.8–7.7)
SEGMENTED NEUTROPHILS ABSOLUTE COUNT: 12.09 K/UL (ref 1.8–7.7)
SEGMENTED NEUTROPHILS ABSOLUTE COUNT: 5.41 K/UL (ref 1.8–7.7)
SEGMENTED NEUTROPHILS ABSOLUTE COUNT: 5.55 K/UL (ref 1.8–7.7)
SEGMENTED NEUTROPHILS ABSOLUTE COUNT: 6.06 K/UL (ref 1.8–7.7)
SEGMENTED NEUTROPHILS ABSOLUTE COUNT: 6.34 K/UL (ref 1.8–7.7)
SEGMENTED NEUTROPHILS ABSOLUTE COUNT: 7.72 K/UL (ref 1.5–8.1)
SODIUM BLD-SCNC: 134 MMOL/L (ref 135–144)
SODIUM BLD-SCNC: 135 MMOL/L (ref 135–144)
TOTAL IRON BINDING CAPACITY: 272 UG/DL (ref 250–450)
TOTAL IRON BINDING CAPACITY: 275 UG/DL (ref 250–450)
TOTAL IRON BINDING CAPACITY: 286 UG/DL (ref 250–450)
TOTAL PROTEIN: 6.8 G/DL (ref 6.4–8.3)
TRIGL SERPL-MCNC: 98 MG/DL
UNSATURATED IRON BINDING CAPACITY: 185 UG/DL (ref 112–347)
UNSATURATED IRON BINDING CAPACITY: 193 UG/DL (ref 112–347)
UNSATURATED IRON BINDING CAPACITY: 238 UG/DL (ref 112–347)
VLDLC SERPL CALC-MCNC: ABNORMAL MG/DL (ref 1–30)
WBC # BLD: 12.9 K/UL (ref 3.5–11.3)
WBC # BLD: 15 K/UL (ref 3.5–11.3)
WBC # BLD: 16.1 K/UL (ref 3.5–11.3)
WBC # BLD: 16.4 K/UL (ref 3.5–11.3)
WBC # BLD: 17.6 K/UL (ref 3.5–11.3)
WBC # BLD: 18.3 K/UL (ref 3.5–11.3)
WBC # BLD: 22.8 K/UL (ref 3.5–11.3)
WBC # BLD: ABNORMAL 10*3/UL

## 2020-01-01 PROCEDURE — G8427 DOCREV CUR MEDS BY ELIG CLIN: HCPCS | Performed by: INTERNAL MEDICINE

## 2020-01-01 PROCEDURE — 4040F PNEUMOC VAC/ADMIN/RCVD: CPT | Performed by: INTERNAL MEDICINE

## 2020-01-01 PROCEDURE — 99213 OFFICE O/P EST LOW 20 MIN: CPT | Performed by: NURSE PRACTITIONER

## 2020-01-01 PROCEDURE — 1090F PRES/ABSN URINE INCON ASSESS: CPT | Performed by: NURSE PRACTITIONER

## 2020-01-01 PROCEDURE — 4040F PNEUMOC VAC/ADMIN/RCVD: CPT | Performed by: FAMILY MEDICINE

## 2020-01-01 PROCEDURE — 1036F TOBACCO NON-USER: CPT | Performed by: FAMILY MEDICINE

## 2020-01-01 PROCEDURE — 1036F TOBACCO NON-USER: CPT | Performed by: INTERNAL MEDICINE

## 2020-01-01 PROCEDURE — U0003 INFECTIOUS AGENT DETECTION BY NUCLEIC ACID (DNA OR RNA); SEVERE ACUTE RESPIRATORY SYNDROME CORONAVIRUS 2 (SARS-COV-2) (CORONAVIRUS DISEASE [COVID-19]), AMPLIFIED PROBE TECHNIQUE, MAKING USE OF HIGH THROUGHPUT TECHNOLOGIES AS DESCRIBED BY CMS-2020-01-R: HCPCS

## 2020-01-01 PROCEDURE — 1123F ACP DISCUSS/DSCN MKR DOCD: CPT | Performed by: FAMILY MEDICINE

## 2020-01-01 PROCEDURE — 99212 OFFICE O/P EST SF 10 MIN: CPT

## 2020-01-01 PROCEDURE — 1090F PRES/ABSN URINE INCON ASSESS: CPT | Performed by: INTERNAL MEDICINE

## 2020-01-01 PROCEDURE — 10060 I&D ABSCESS SIMPLE/SINGLE: CPT | Performed by: SURGERY

## 2020-01-01 PROCEDURE — 99214 OFFICE O/P EST MOD 30 MIN: CPT

## 2020-01-01 PROCEDURE — G8427 DOCREV CUR MEDS BY ELIG CLIN: HCPCS | Performed by: FAMILY MEDICINE

## 2020-01-01 PROCEDURE — 36415 COLL VENOUS BLD VENIPUNCTURE: CPT

## 2020-01-01 PROCEDURE — 1123F ACP DISCUSS/DSCN MKR DOCD: CPT | Performed by: INTERNAL MEDICINE

## 2020-01-01 PROCEDURE — 85055 RETICULATED PLATELET ASSAY: CPT

## 2020-01-01 PROCEDURE — 83540 ASSAY OF IRON: CPT

## 2020-01-01 PROCEDURE — G8484 FLU IMMUNIZE NO ADMIN: HCPCS | Performed by: INTERNAL MEDICINE

## 2020-01-01 PROCEDURE — G8420 CALC BMI NORM PARAMETERS: HCPCS | Performed by: NURSE PRACTITIONER

## 2020-01-01 PROCEDURE — 1090F PRES/ABSN URINE INCON ASSESS: CPT | Performed by: FAMILY MEDICINE

## 2020-01-01 PROCEDURE — 99213 OFFICE O/P EST LOW 20 MIN: CPT | Performed by: INTERNAL MEDICINE

## 2020-01-01 PROCEDURE — 99397 PER PM REEVAL EST PAT 65+ YR: CPT

## 2020-01-01 PROCEDURE — 85025 COMPLETE CBC W/AUTO DIFF WBC: CPT

## 2020-01-01 PROCEDURE — 80053 COMPREHEN METABOLIC PANEL: CPT

## 2020-01-01 PROCEDURE — 80048 BASIC METABOLIC PNL TOTAL CA: CPT

## 2020-01-01 PROCEDURE — 99214 OFFICE O/P EST MOD 30 MIN: CPT | Performed by: INTERNAL MEDICINE

## 2020-01-01 PROCEDURE — 83550 IRON BINDING TEST: CPT

## 2020-01-01 PROCEDURE — 73610 X-RAY EXAM OF ANKLE: CPT

## 2020-01-01 PROCEDURE — G8420 CALC BMI NORM PARAMETERS: HCPCS | Performed by: FAMILY MEDICINE

## 2020-01-01 PROCEDURE — 99212 OFFICE O/P EST SF 10 MIN: CPT | Performed by: SURGERY

## 2020-01-01 PROCEDURE — 99024 POSTOP FOLLOW-UP VISIT: CPT | Performed by: PHYSICIAN ASSISTANT

## 2020-01-01 PROCEDURE — 4040F PNEUMOC VAC/ADMIN/RCVD: CPT | Performed by: NURSE PRACTITIONER

## 2020-01-01 PROCEDURE — 71101 X-RAY EXAM UNILAT RIBS/CHEST: CPT

## 2020-01-01 PROCEDURE — G0463 HOSPITAL OUTPT CLINIC VISIT: HCPCS

## 2020-01-01 PROCEDURE — G8484 FLU IMMUNIZE NO ADMIN: HCPCS | Performed by: FAMILY MEDICINE

## 2020-01-01 PROCEDURE — 99024 POSTOP FOLLOW-UP VISIT: CPT | Performed by: SURGERY

## 2020-01-01 PROCEDURE — 99213 OFFICE O/P EST LOW 20 MIN: CPT | Performed by: FAMILY MEDICINE

## 2020-01-01 PROCEDURE — 99213 OFFICE O/P EST LOW 20 MIN: CPT

## 2020-01-01 PROCEDURE — 10140 I&D HMTMA SEROMA/FLUID COLLJ: CPT | Performed by: SURGERY

## 2020-01-01 PROCEDURE — PBSHW INFLUENZA, QUADV, ADJUVANTED, 65 YRS +, IM, PF, PREFILL SYR, 0.5ML (FLUAD): Performed by: INTERNAL MEDICINE

## 2020-01-01 PROCEDURE — G8420 CALC BMI NORM PARAMETERS: HCPCS | Performed by: INTERNAL MEDICINE

## 2020-01-01 PROCEDURE — G0439 PPPS, SUBSEQ VISIT: HCPCS | Performed by: INTERNAL MEDICINE

## 2020-01-01 PROCEDURE — 73080 X-RAY EXAM OF ELBOW: CPT

## 2020-01-01 PROCEDURE — 1090F PRES/ABSN URINE INCON ASSESS: CPT | Performed by: SURGERY

## 2020-01-01 PROCEDURE — 1036F TOBACCO NON-USER: CPT | Performed by: NURSE PRACTITIONER

## 2020-01-01 PROCEDURE — 4040F PNEUMOC VAC/ADMIN/RCVD: CPT | Performed by: SURGERY

## 2020-01-01 PROCEDURE — 85610 PROTHROMBIN TIME: CPT

## 2020-01-01 PROCEDURE — 90694 VACC AIIV4 NO PRSRV 0.5ML IM: CPT | Performed by: INTERNAL MEDICINE

## 2020-01-01 PROCEDURE — 99212 OFFICE O/P EST SF 10 MIN: CPT | Performed by: NURSE PRACTITIONER

## 2020-01-01 PROCEDURE — G8484 FLU IMMUNIZE NO ADMIN: HCPCS | Performed by: SURGERY

## 2020-01-01 PROCEDURE — G8419 CALC BMI OUT NRM PARAM NOF/U: HCPCS | Performed by: INTERNAL MEDICINE

## 2020-01-01 PROCEDURE — L4361 PNEUMA/VAC WALK BOOT PRE OTS: HCPCS | Performed by: NURSE PRACTITIONER

## 2020-01-01 PROCEDURE — G8428 CUR MEDS NOT DOCUMENT: HCPCS | Performed by: SURGERY

## 2020-01-01 PROCEDURE — 80061 LIPID PANEL: CPT

## 2020-01-01 PROCEDURE — 99204 OFFICE O/P NEW MOD 45 MIN: CPT | Performed by: INTERNAL MEDICINE

## 2020-01-01 PROCEDURE — G8427 DOCREV CUR MEDS BY ELIG CLIN: HCPCS | Performed by: NURSE PRACTITIONER

## 2020-01-01 PROCEDURE — 85730 THROMBOPLASTIN TIME PARTIAL: CPT

## 2020-01-01 PROCEDURE — 1123F ACP DISCUSS/DSCN MKR DOCD: CPT | Performed by: NURSE PRACTITIONER

## 2020-01-01 PROCEDURE — 99212 OFFICE O/P EST SF 10 MIN: CPT | Performed by: FAMILY MEDICINE

## 2020-01-01 PROCEDURE — G8417 CALC BMI ABV UP PARAM F/U: HCPCS | Performed by: INTERNAL MEDICINE

## 2020-01-01 PROCEDURE — 1123F ACP DISCUSS/DSCN MKR DOCD: CPT | Performed by: SURGERY

## 2020-01-01 PROCEDURE — 1036F TOBACCO NON-USER: CPT | Performed by: SURGERY

## 2020-01-01 PROCEDURE — G8420 CALC BMI NORM PARAMETERS: HCPCS | Performed by: SURGERY

## 2020-01-01 RX ORDER — CLOBETASOL PROPIONATE 0.5 MG/G
OINTMENT TOPICAL ONCE
Status: CANCELLED | OUTPATIENT
Start: 2020-01-01

## 2020-01-01 RX ORDER — CEPHALEXIN 500 MG/1
500 CAPSULE ORAL 3 TIMES DAILY
Qty: 30 CAPSULE | Refills: 0 | Status: SHIPPED | OUTPATIENT
Start: 2020-01-01 | End: 2020-01-01

## 2020-01-01 RX ORDER — LIDOCAINE 50 MG/G
OINTMENT TOPICAL ONCE
Status: CANCELLED | OUTPATIENT
Start: 2020-01-01

## 2020-01-01 RX ORDER — DOXYCYCLINE HYCLATE 100 MG
100 TABLET ORAL 2 TIMES DAILY
Qty: 20 TABLET | Refills: 0 | Status: SHIPPED | OUTPATIENT
Start: 2020-01-01 | End: 2020-01-01

## 2020-01-01 RX ORDER — LIDOCAINE HYDROCHLORIDE 40 MG/ML
SOLUTION TOPICAL ONCE
Status: CANCELLED | OUTPATIENT
Start: 2020-01-01

## 2020-01-01 RX ORDER — HYDROXYUREA 500 MG/1
500 CAPSULE ORAL DAILY
Qty: 30 CAPSULE | Refills: 3 | Status: SHIPPED | OUTPATIENT
Start: 2020-01-01

## 2020-01-01 RX ORDER — GINSENG 100 MG
CAPSULE ORAL ONCE
Status: CANCELLED | OUTPATIENT
Start: 2020-01-01

## 2020-01-01 RX ORDER — FUROSEMIDE 20 MG/1
TABLET ORAL
Qty: 30 TABLET | Refills: 3 | Status: SHIPPED | OUTPATIENT
Start: 2020-01-01 | End: 2021-01-01 | Stop reason: ALTCHOICE

## 2020-01-01 RX ORDER — BACITRACIN ZINC AND POLYMYXIN B SULFATE 500; 1000 [USP'U]/G; [USP'U]/G
OINTMENT TOPICAL ONCE
Status: CANCELLED | OUTPATIENT
Start: 2020-01-01

## 2020-01-01 RX ORDER — LIDOCAINE HYDROCHLORIDE 20 MG/ML
JELLY TOPICAL ONCE
Status: CANCELLED | OUTPATIENT
Start: 2020-01-01

## 2020-01-01 RX ORDER — BETAMETHASONE DIPROPIONATE 0.05 %
OINTMENT (GRAM) TOPICAL ONCE
Status: CANCELLED | OUTPATIENT
Start: 2020-01-01

## 2020-01-01 RX ORDER — LIDOCAINE 40 MG/G
CREAM TOPICAL ONCE
Status: CANCELLED | OUTPATIENT
Start: 2020-01-01

## 2020-01-01 RX ORDER — BACITRACIN, NEOMYCIN, POLYMYXIN B 400; 3.5; 5 [USP'U]/G; MG/G; [USP'U]/G
OINTMENT TOPICAL ONCE
Status: CANCELLED | OUTPATIENT
Start: 2020-01-01

## 2020-01-01 RX ORDER — GENTAMICIN SULFATE 1 MG/G
OINTMENT TOPICAL ONCE
Status: CANCELLED | OUTPATIENT
Start: 2020-01-01

## 2020-01-01 RX ORDER — HYDROXYUREA 500 MG/1
500 CAPSULE ORAL 2 TIMES DAILY
Qty: 60 CAPSULE | Refills: 0 | Status: SHIPPED | OUTPATIENT
Start: 2020-01-01 | End: 2020-01-01

## 2020-01-01 ASSESSMENT — ENCOUNTER SYMPTOMS
SHORTNESS OF BREATH: 0
COLOR CHANGE: 1
CONSTIPATION: 0
VOMITING: 0
CONSTIPATION: 0
ABDOMINAL PAIN: 0
VOMITING: 0
DIARRHEA: 0
BLOOD IN STOOL: 0
ALLERGIC/IMMUNOLOGIC NEGATIVE: 1
COUGH: 0
NAUSEA: 0
BACK PAIN: 0
VOMITING: 0
COUGH: 0
EYES NEGATIVE: 1
EYE PAIN: 0
RESPIRATORY NEGATIVE: 1
VOMITING: 0
DIARRHEA: 0
BACK PAIN: 0
NAUSEA: 0
CONSTIPATION: 0
GASTROINTESTINAL NEGATIVE: 1
DIARRHEA: 0
DIARRHEA: 1
EYE PAIN: 0
BLOOD IN STOOL: 0
SHORTNESS OF BREATH: 0
RHINORRHEA: 0
ABDOMINAL PAIN: 0
DIARRHEA: 0
ABDOMINAL PAIN: 0
EYES NEGATIVE: 1
COLOR CHANGE: 1
SHORTNESS OF BREATH: 1
NAUSEA: 0
ABDOMINAL PAIN: 0
EYE PAIN: 0
BACK PAIN: 0
NAUSEA: 0
DIARRHEA: 0
CHEST TIGHTNESS: 0
SINUS PAIN: 0
RESPIRATORY NEGATIVE: 1
GASTROINTESTINAL NEGATIVE: 1
NAUSEA: 0
SHORTNESS OF BREATH: 0
EYE PAIN: 0
BLOOD IN STOOL: 0
BACK PAIN: 0
COUGH: 0
SHORTNESS OF BREATH: 0
COUGH: 0
CONSTIPATION: 0
ALLERGIC/IMMUNOLOGIC NEGATIVE: 1
DIARRHEA: 0
SHORTNESS OF BREATH: 0
VOMITING: 0
COUGH: 0
ABDOMINAL PAIN: 0
COUGH: 0
COUGH: 0
SHORTNESS OF BREATH: 0
BLOOD IN STOOL: 0

## 2020-01-01 ASSESSMENT — PATIENT HEALTH QUESTIONNAIRE - PHQ9
SUM OF ALL RESPONSES TO PHQ QUESTIONS 1-9: 0
2. FEELING DOWN, DEPRESSED OR HOPELESS: 0
SUM OF ALL RESPONSES TO PHQ QUESTIONS 1-9: 0
SUM OF ALL RESPONSES TO PHQ QUESTIONS 1-9: 0
SUM OF ALL RESPONSES TO PHQ9 QUESTIONS 1 & 2: 0
SUM OF ALL RESPONSES TO PHQ QUESTIONS 1-9: 0
1. LITTLE INTEREST OR PLEASURE IN DOING THINGS: 0
SUM OF ALL RESPONSES TO PHQ9 QUESTIONS 1 & 2: 0
SUM OF ALL RESPONSES TO PHQ QUESTIONS 1-9: 0
SUM OF ALL RESPONSES TO PHQ QUESTIONS 1-9: 0
2. FEELING DOWN, DEPRESSED OR HOPELESS: 0
SUM OF ALL RESPONSES TO PHQ9 QUESTIONS 1 & 2: 0
SUM OF ALL RESPONSES TO PHQ QUESTIONS 1-9: 0
SUM OF ALL RESPONSES TO PHQ QUESTIONS 1-9: 0
SUM OF ALL RESPONSES TO PHQ9 QUESTIONS 1 & 2: 0
1. LITTLE INTEREST OR PLEASURE IN DOING THINGS: 0
2. FEELING DOWN, DEPRESSED OR HOPELESS: 0
1. LITTLE INTEREST OR PLEASURE IN DOING THINGS: 0
1. LITTLE INTEREST OR PLEASURE IN DOING THINGS: 0
SUM OF ALL RESPONSES TO PHQ9 QUESTIONS 1 & 2: 0
2. FEELING DOWN, DEPRESSED OR HOPELESS: 0
SUM OF ALL RESPONSES TO PHQ QUESTIONS 1-9: 0
2. FEELING DOWN, DEPRESSED OR HOPELESS: 0
SUM OF ALL RESPONSES TO PHQ QUESTIONS 1-9: 0
1. LITTLE INTEREST OR PLEASURE IN DOING THINGS: 0
SUM OF ALL RESPONSES TO PHQ QUESTIONS 1-9: 0

## 2020-01-01 ASSESSMENT — PAIN SCALES - GENERAL
PAINLEVEL_OUTOF10: 0
PAINLEVEL_OUTOF10: 5

## 2020-01-01 ASSESSMENT — LIFESTYLE VARIABLES: HOW OFTEN DO YOU HAVE A DRINK CONTAINING ALCOHOL: 0

## 2020-05-04 NOTE — PROGRESS NOTES
Negative for hearing loss. Eyes: Negative for pain and visual disturbance. Respiratory: Negative for cough and shortness of breath. Cardiovascular: Negative for chest pain, palpitations and leg swelling. Gastrointestinal: Negative for abdominal pain, blood in stool, constipation, diarrhea, nausea and vomiting. Endocrine: Negative for cold intolerance, polydipsia and polyuria. Genitourinary: Negative for difficulty urinating, dysuria and hematuria. Musculoskeletal: Negative for arthralgias, back pain, gait problem and neck pain. Skin: Negative for pallor and rash. Neurological: Negative for dizziness, weakness, numbness and headaches. Hematological: Negative for adenopathy. Does not bruise/bleed easily. Psychiatric/Behavioral: Negative for confusion. The patient is not nervous/anxious. Objective:     Physical Exam  Vitals signs reviewed. Constitutional:       Appearance: She is well-developed. HENT:      Head: Normocephalic and atraumatic. Eyes:      Pupils: Pupils are equal, round, and reactive to light. Neck:      Musculoskeletal: Neck supple. Cardiovascular:      Rate and Rhythm: Normal rate and regular rhythm. Heart sounds: No murmur. No friction rub. No gallop. Pulmonary:      Effort: Pulmonary effort is normal.      Breath sounds: Normal breath sounds. No wheezing or rales. Abdominal:      General: There is no distension. Palpations: Abdomen is soft. There is no mass. Tenderness: There is no abdominal tenderness. There is no rebound. Musculoskeletal: Normal range of motion. Lymphadenopathy:      Cervical: No cervical adenopathy. Skin:     General: Skin is warm and dry. Findings: No rash. Neurological:      Mental Status: She is alert and oriented to person, place, and time. Cranial Nerves: No cranial nerve deficit (grossly). Psychiatric:         Thought Content:  Thought content normal.        BP (!) 130/90 (Site: Left Upper Arm, Position: Standing, Cuff Size: Medium Adult)   Pulse 72   Resp 16   Ht 5' (1.524 m)   Wt 118 lb (53.5 kg)   BMI 23.05 kg/m²     Assessment:       Diagnosis Orders   1. Essential hypertension     2. Other hyperlipidemia     3. Fall, initial encounter     4. Acute left ankle pain  XR ANKLE LEFT (MIN 3 VIEWS)   5. Rib pain on left side  XR RIBS LEFT (2 VIEWS)   6. Other iron deficiency anemia  Iron and TIBC    Iron and TIBC    CBC Auto Differential    CBC Auto Differential   7. Cardiomegaly  [facility], [place]    Echocardiogram complete   John Razo last night after some lightheadedness. Bleeds. BC showed platelet count was held because of the we will recheck the CBC if she is anemic is low given Rx for iron pill. Since nosebleeds have stopped, if platelet count is still excessively high then we will restart baby aspirin         Plan:       Return in about 3 months (around 8/4/2020) for Hypertension, Hyperlipidemia, Anemia. Orders Placed This Encounter   Procedures    XR ANKLE LEFT (MIN 3 VIEWS)     Standing Status:   Future     Standing Expiration Date:   5/4/2021    XR RIBS LEFT (2 VIEWS)     Standing Status:   Future     Standing Expiration Date:   5/4/2021    Iron and TIBC     Standing Status:   Future     Standing Expiration Date:   5/4/2021     Order Specific Question:   Is Patient Fasting? Answer:   no     Order Specific Question:   No of Hours? Answer:   no    Iron and TIBC     Standing Status:   Future     Standing Expiration Date:   5/4/2021     Order Specific Question:   Is Patient Fasting? Answer:   na     Order Specific Question:   No of Hours?      Answer:   na    CBC Auto Differential     Standing Status:   Future     Standing Expiration Date:   5/4/2021    CBC Auto Differential     Standing Status:   Future     Standing Expiration Date:   5/4/2021   [facility] [place]     Referral Priority:   Routine     Referral Type:   Eval and Treat     Referral Reason:   Specialty Services Required     Requested Specialty:   Cardiology     Number of Visits Requested:   1    Echocardiogram complete     Standing Status:   Future     Standing Expiration Date:   7/3/2020     Order Specific Question:   Reason for exam:     Answer:   enlarged heart     No orders of the defined types were placed in this encounter. Patientgiven educational materials - see patient instructions. Discussed use, benefit,and side effects of prescribed medications. All patient questions answered. Ptvoiced understanding. Reviewed health maintenance. Instructed to continue currentmedications, diet and exercise. Patient agreed with treatment plan. Follow up asdirected.      Electronically signed by Ishaan Pretty MD on 5/4/2020 at 2:00 PM

## 2020-05-05 NOTE — PROGRESS NOTES
condition.         Procedure:        Electronically signed by TARUN Huerta CNP on 5/5/2020 at 1:33 PM

## 2020-08-04 PROBLEM — M25.572 CHRONIC PAIN OF LEFT ANKLE: Status: ACTIVE | Noted: 2020-01-01

## 2020-08-04 PROBLEM — D47.3 ESSENTIAL THROMBOCYTOSIS (HCC): Status: ACTIVE | Noted: 2020-01-01

## 2020-08-04 PROBLEM — G89.29 CHRONIC PAIN OF LEFT ANKLE: Status: ACTIVE | Noted: 2020-01-01

## 2020-08-04 NOTE — TELEPHONE ENCOUNTER
Writer spoke with Dr. Laura Corbin and per his research pt is refusing hydrea. Dr. Laura Corbin stated that will discuss at patients follow up appointment on 08/17/2020.

## 2020-08-04 NOTE — TELEPHONE ENCOUNTER
Danis Soto called from [facility] lab to notify of critical platelet count of 4537. Writer notified Dr. Stone Camejo, awaiting further instructions.

## 2020-08-04 NOTE — PROGRESS NOTES
[facility]mane  [address] [place] [address]  Dept: [phone]  Dept Fax: [phone]  Loc: [phone]     Julieta[address] [place] [address] is a 80 y.o. female who presents today for her medical conditions/complaintsas noted below.   [address] [place] [address] is c/o of   Chief Complaint   Patient presents with    Hypertension     3 month appt    Hyperlipidemia    Ankle Pain     Left         HPI:     HPI    No results found for: LABA1C         No results found for: LABMICR  LDL Cholesterol (mg/dL)   Date Value   04/23/2019 44   04/18/2018 42   04/11/2017 51         AST (U/L)   Date Value   04/23/2019 23     ALT (U/L)   Date Value   04/23/2019 16     BUN (mg/dL)   Date Value   04/23/2019 16     BP Readings from Last 3 Encounters:   08/04/20 (!) 148/82   06/16/20 138/74   06/01/20 138/78              Past Medical History:   Diagnosis Date    Hyperlipidemia     Hypertension     Impaired fasting glucose     Macular degeneration     Osteopenia     Urinary urgency       Past Surgical History:   Procedure Laterality Date    CATARACT REMOVAL WITH IMPLANT Left     COLONOSCOPY  11/2006    severe diverticulosis       Family History   Problem Relation Age of Onset    Cancer Mother         leukemia    Heart Disease Father     Heart Attack Father     Cancer Brother         unknown          Social History     Tobacco Use    Smoking status: Never Smoker    Smokeless tobacco: Never Used   Substance Use Topics    Alcohol use: No         Current Outpatient Medications   Medication Sig Dispense Refill    aspirin 81 MG tablet Take 81 mg by mouth daily      atorvastatin (LIPITOR) 10 MG tablet TAKE ONE TABLET BY MOUTH ONCE DAILY 90 tablet 3    metoprolol tartrate (LOPRESSOR) 25 MG tablet TAKE 1 TABLET BY MOUTH TWICE DAILY 180 tablet 3    timolol (TIMOPTIC) 0.5 % ophthalmic solution Place 1 drop into both eyes every morning  6    ibuprofen Iron and TIBC     Standing Status:   Future     Standing Expiration Date:   8/4/2021     Order Specific Question:   Is Patient Fasting? Answer:   no     Order Specific Question:   No of Hours? Answer:   no     No orders of the defined types were placed in this encounter. Patientgiven educational materials - see patient instructions. Discussed use, benefit,and side effects of prescribed medications. All patient questions answered. Ptvoiced understanding. Reviewed health maintenance. Instructed to continue currentmedications, diet and exercise. Patient agreed with treatment plan. Follow up asdirected.      Electronically signed by Lia Tello MD on 8/4/2020 at 3:08 PM

## 2020-11-05 NOTE — TELEPHONE ENCOUNTER
X-ray order signed. Also instruct patient to ice area and try to elevate at least somewhat.   We will await stat read on the x-ray

## 2020-11-06 NOTE — PROGRESS NOTES
[facility]  [facility] [place]  [place] [address] [place]  Dept: [phone]  Dept Fax: [phone]  Loc: [phone]        CHIEF COMPLAINT       Chief Complaint   Patient presents with    Other     diarrhea,started tuesday morning daughter wants her tested for covid       Nurses Notes reviewed and I agree except as noted in the HPI. HISTORY OF PRESENT ILLNESS   Hailee Arthur is a 80 y.o. female who presents to [address] [place] [address] Urgent Care today (11/6/2020) for evaluation of:   Pt here for evaluation of diarrhea x 2 days which has since resolved; pt states daughters are asking her to be tested for covid before they will visit her. Diarrhea    This is a new problem. The current episode started in the past 7 days (lasted for appx 2 days). The problem occurs 2 to 4 times per day. The problem has been resolved. The stool consistency is described as watery. The patient states that diarrhea awakens her from sleep. Pertinent negatives include no abdominal pain, chills, coughing, fever, headaches, myalgias, URI or vomiting. Nothing aggravates the symptoms. She has tried bismuth subsalicylate for the symptoms. The treatment provided significant relief. REVIEW OF SYSTEMS     Review of Systems   Constitutional: Negative for chills, fatigue and fever. HENT: Negative for congestion, rhinorrhea and sinus pain. Respiratory: Negative for cough, chest tightness and shortness of breath. Cardiovascular: Negative for chest pain. Gastrointestinal: Positive for diarrhea. Negative for abdominal pain, nausea and vomiting. Musculoskeletal: Negative for myalgias. Neurological: Negative for headaches.        PAST MEDICAL HISTORY         Diagnosis Date    Hyperlipidemia     Hypertension     Impaired fasting glucose     Macular degeneration     Osteopenia     Urinary urgency        SURGICAL HISTORY     Patient  has a past surgical history that includes Cataract removal with implant (Left) and Colonoscopy (11/2006). CURRENT MEDICATIONS       Outpatient Medications Prior to Visit   Medication Sig Dispense Refill    aspirin 81 MG tablet Take 81 mg by mouth daily      atorvastatin (LIPITOR) 10 MG tablet TAKE ONE TABLET BY MOUTH ONCE DAILY 90 tablet 3    metoprolol tartrate (LOPRESSOR) 25 MG tablet TAKE 1 TABLET BY MOUTH TWICE DAILY 180 tablet 3    timolol (TIMOPTIC) 0.5 % ophthalmic solution Place 1 drop into both eyes every morning  6    ibuprofen (ADVIL;MOTRIN) 200 MG tablet Take 200 mg by mouth every 8 hours as needed for Pain       Handicap Placard MISC by Does not apply route Exp 10/30/2021 1 each 0    Cholecalciferol (VITAMIN D3) 2000 units CAPS Take 2,000 Units by mouth daily      latanoprost (XALATAN) 0.005 % ophthalmic solution Place 1 drop into both eyes nightly      Multiple Vitamins-Minerals (PRESERVISION AREDS) CAPS Take  by mouth 2 times daily.  Calcium 1500 MG TABS daily. No facility-administered medications prior to visit. ALLERGIES     Patient is is allergic to macrobid [nitrofurantoin] and sulfa antibiotics. FAMILY HISTORY     Patient's family history includes Cancer in her brother and mother; Heart Attack in her father; Heart Disease in her father. SOCIAL HISTORY     Patient  reports that she has never smoked. She has never used smokeless tobacco. She reports that she does not drink alcohol or use drugs. PHYSICAL EXAM     VITALS  BP: 138/82, Temp: 98 °F (36.7 °C), Pulse: 75, Resp: 16, SpO2: 98 %  Physical Exam  Vitals signs reviewed. Constitutional:       General: She is not in acute distress. Appearance: Normal appearance. She is not ill-appearing. HENT:      Nose: Nose normal. No congestion. Mouth/Throat:      Mouth: Mucous membranes are moist.      Pharynx: Oropharynx is clear. Eyes:      Pupils: Pupils are equal, round, and reactive to light.    Neck:      Musculoskeletal: Normal range of motion and neck supple. Cardiovascular:      Rate and Rhythm: Normal rate and regular rhythm. Heart sounds: Normal heart sounds. No murmur. Pulmonary:      Effort: Pulmonary effort is normal. No respiratory distress. Breath sounds: Normal breath sounds. No wheezing or rhonchi. Abdominal:      General: Abdomen is flat. Bowel sounds are normal.      Palpations: Abdomen is soft. Musculoskeletal: Normal range of motion. Lymphadenopathy:      Cervical: No cervical adenopathy. Skin:     General: Skin is warm and dry. Capillary Refill: Capillary refill takes less than 2 seconds. Neurological:      General: No focal deficit present. Mental Status: She is alert. DIAGNOSTIC RESULTS   Labs:No results found for this visit on 11/06/20. IMAGING:        CLINICAL COURSE:     Vitals:    11/06/20 1828   BP: 138/82   Pulse: 75   Resp: 16   Temp: 98 °F (36.7 °C)   TempSrc: Tympanic   SpO2: 98%   Weight: 118 lb 9.6 oz (53.8 kg)   Height: 4' 11\" (1.499 m)           PROCEDURES:  None  FINAL IMPRESSION      1. Diarrhea, unspecified type    2. Person under investigation for COVID-19         DISPOSITION/PLAN     Patient Instructions     Make sure you are taking in enough fluids such as water or even Gatorade. Try bland, soft diet including toast, applesauce, jello, bananas, chicken soup, etc and increase as you feel better. Avoid greasy, spicy, fatty foods at this time. Will notify pt of covid test result. Pt should isolate at home away in an area away from family and quarantine at home until result is available. Patient Education        Diarrhea: Care Instructions  Your Care Instructions     Diarrhea is loose, watery stools (bowel movements). The exact cause is often hard to find. Sometimes diarrhea is your body's way of getting rid of what caused an upset stomach. Viruses, food poisoning, and many medicines can cause diarrhea.  Some people get diarrhea in response to emotional stress, anxiety, or certain foods. Almost everyone has diarrhea now and then. It usually isn't serious, and your stools will return to normal soon. The important thing to do is replace the fluids you have lost, so you can prevent dehydration. The doctor has checked you carefully, but problems can develop later. If you notice any problems or new symptoms, get medical treatment right away. Follow-up care is a key part of your treatment and safety. Be sure to make and go to all appointments, and call your doctor if you are having problems. It's also a good idea to know your test results and keep a list of the medicines you take. How can you care for yourself at home? · Watch for signs of dehydration, which means your body has lost too much water. Dehydration is a serious condition and should be treated right away. Signs of dehydration are:  ? Increasing thirst and dry eyes and mouth. ? Feeling faint or lightheaded. ? A smaller amount of urine than normal.  · To prevent dehydration, drink plenty of fluids. Choose water and other caffeine-free clear liquids until you feel better. If you have kidney, heart, or liver disease and have to limit fluids, talk with your doctor before you increase the amount of fluids you drink. · Begin eating small amounts of mild foods the next day, if you feel like it. ? Try yogurt that has live cultures of Lactobacillus. (Check the label.)  ? Avoid spicy foods, fruits, alcohol, and caffeine until 48 hours after all symptoms are gone. ? Avoid chewing gum that contains sorbitol. ? Avoid dairy products (except for yogurt with Lactobacillus) while you have diarrhea and for 3 days after symptoms are gone. · The doctor may recommend that you take over-the-counter medicine, such as loperamide (Imodium), if you still have diarrhea after 6 hours. Read and follow all instructions on the label.  Do not use this medicine if you have bloody diarrhea, a high fever, or other signs of serious Diagnosis of ill patient     Order Specific Question:   Symptomatic for COVID-19 as defined by CDC? Answer:   Yes     Order Specific Question:   Date of Symptom Onset     Answer:   11/3/2020     Order Specific Question:   Hospitalized for COVID-19? Answer:   No     Order Specific Question:   Admitted to ICU for COVID-19? Answer:   No     Order Specific Question:   Employed in healthcare setting? Answer:   No     Order Specific Question:   Resident in a congregate (group) care setting? Answer:   No     Order Specific Question:   Pregnant? Answer:   No     Order Specific Question:   Previously tested for COVID-19? Answer:   No     Outpatient Encounter Medications as of 11/6/2020   Medication Sig Dispense Refill    aspirin 81 MG tablet Take 81 mg by mouth daily      atorvastatin (LIPITOR) 10 MG tablet TAKE ONE TABLET BY MOUTH ONCE DAILY 90 tablet 3    metoprolol tartrate (LOPRESSOR) 25 MG tablet TAKE 1 TABLET BY MOUTH TWICE DAILY 180 tablet 3    timolol (TIMOPTIC) 0.5 % ophthalmic solution Place 1 drop into both eyes every morning  6    ibuprofen (ADVIL;MOTRIN) 200 MG tablet Take 200 mg by mouth every 8 hours as needed for Pain       Handicap Placard MISC by Does not apply route Exp 10/30/2021 1 each 0    Cholecalciferol (VITAMIN D3) 2000 units CAPS Take 2,000 Units by mouth daily      latanoprost (XALATAN) 0.005 % ophthalmic solution Place 1 drop into both eyes nightly      Multiple Vitamins-Minerals (PRESERVISION AREDS) CAPS Take  by mouth 2 times daily.  Calcium 1500 MG TABS daily. No facility-administered encounter medications on file as of 11/6/2020. No follow-ups on file.                 Electronically signed by Shelby Goodell, APRN - NP on 11/6/2020 at 8:29 PM

## 2020-11-06 NOTE — PATIENT INSTRUCTIONS
Make sure you are taking in enough fluids such as water or even Gatorade. Try bland, soft diet including toast, applesauce, jello, bananas, chicken soup, etc and increase as you feel better. Avoid greasy, spicy, fatty foods at this time. Will notify pt of covid test result. Pt should isolate at home away in an area away from family and quarantine at home until result is available. Patient Education        Diarrhea: Care Instructions  Your Care Instructions     Diarrhea is loose, watery stools (bowel movements). The exact cause is often hard to find. Sometimes diarrhea is your body's way of getting rid of what caused an upset stomach. Viruses, food poisoning, and many medicines can cause diarrhea. Some people get diarrhea in response to emotional stress, anxiety, or certain foods. Almost everyone has diarrhea now and then. It usually isn't serious, and your stools will return to normal soon. The important thing to do is replace the fluids you have lost, so you can prevent dehydration. The doctor has checked you carefully, but problems can develop later. If you notice any problems or new symptoms, get medical treatment right away. Follow-up care is a key part of your treatment and safety. Be sure to make and go to all appointments, and call your doctor if you are having problems. It's also a good idea to know your test results and keep a list of the medicines you take. How can you care for yourself at home? · Watch for signs of dehydration, which means your body has lost too much water. Dehydration is a serious condition and should be treated right away. Signs of dehydration are:  ? Increasing thirst and dry eyes and mouth. ? Feeling faint or lightheaded. ? A smaller amount of urine than normal.  · To prevent dehydration, drink plenty of fluids. Choose water and other caffeine-free clear liquids until you feel better.  If you have kidney, heart, or liver disease and have to limit fluids, talk with your doctor before you increase the amount of fluids you drink. · Begin eating small amounts of mild foods the next day, if you feel like it. ? Try yogurt that has live cultures of Lactobacillus. (Check the label.)  ? Avoid spicy foods, fruits, alcohol, and caffeine until 48 hours after all symptoms are gone. ? Avoid chewing gum that contains sorbitol. ? Avoid dairy products (except for yogurt with Lactobacillus) while you have diarrhea and for 3 days after symptoms are gone. · The doctor may recommend that you take over-the-counter medicine, such as loperamide (Imodium), if you still have diarrhea after 6 hours. Read and follow all instructions on the label. Do not use this medicine if you have bloody diarrhea, a high fever, or other signs of serious illness. Call your doctor if you think you are having a problem with your medicine. When should you call for help? Call 911 anytime you think you may need emergency care. For example, call if:    · You passed out (lost consciousness).     · Your stools are maroon or very bloody. Call your doctor now or seek immediate medical care if:    · You are dizzy or lightheaded, or you feel like you may faint.     · Your stools are black and look like tar, or they have streaks of blood.     · You have new or worse belly pain.     · You have symptoms of dehydration, such as:  ? Dry eyes and a dry mouth. ? Passing only a little dark urine. ? Feeling thirstier than usual.     · You have a new or higher fever. Watch closely for changes in your health, and be sure to contact your doctor if:    · Your diarrhea is getting worse.     · You see pus in the diarrhea.     · You are not getting better after 2 days (48 hours). Where can you learn more? Go to https://[employer].Subimage. org and sign in to your [employer] account. Enter D646 in the Ceros box to learn more about \"Diarrhea: Care Instructions. \"     If you do not have an account, please click on

## 2020-11-10 NOTE — PROGRESS NOTES
2020     Hailee Dunne (:  1927) is a 80 y.o. female, here for evaluation of the following medical concerns:    HPI   Acute urgent care visit for right arm issues after a fall out of bed Tuesday night last week. She hit her right elbow at that time. Developing swelling and redness over the elbow since then. More evident yesterday. No fever or chills. Past Medical History:   Diagnosis Date    Hyperlipidemia     Hypertension     Impaired fasting glucose     Macular degeneration     Osteopenia     Urinary urgency      Past Surgical History:   Procedure Laterality Date    CATARACT REMOVAL WITH IMPLANT Left     COLONOSCOPY  2006    severe diverticulosis         Review of Systems   Constitutional: Negative. Negative for fever. HENT: Negative. Eyes: Negative. Respiratory: Negative. Negative for cough and shortness of breath. Cardiovascular: Negative. Gastrointestinal: Negative. Negative for diarrhea (recent/resolved last wed). Endocrine: Negative. Genitourinary: Negative. Musculoskeletal: Positive for arthralgias (right elbow). Skin: Positive for color change (right elbow). Allergic/Immunologic: Negative. Neurological: Negative. Hematological: Negative. Psychiatric/Behavioral: Negative. Prior to Visit Medications    Medication Sig Taking?  Authorizing Provider   metoprolol tartrate (LOPRESSOR) 25 MG tablet take 1 tablet by mouth twice a day Yes Mele Gentile DO   aspirin 81 MG tablet Take 81 mg by mouth daily Yes Historical Provider, MD   atorvastatin (LIPITOR) 10 MG tablet TAKE ONE TABLET BY MOUTH ONCE DAILY Yes Grace Hodgson MD   timolol (TIMOPTIC) 0.5 % ophthalmic solution Place 1 drop into both eyes every morning Yes Historical Provider, MD   ibuprofen (ADVIL;MOTRIN) 200 MG tablet Take 200 mg by mouth every 8 hours as needed for Pain  Yes Historical Provider, MD   Handicap Placard MISC by Does not apply route Exp right elbow could be related to    contusion in the setting of trauma but could also be seen with edema or    cellulitis.               ASSESSMENT/PLAN:  Encounter Diagnosis   Name Primary?  Contusion of right elbow, initial encounter Yes     Somewhat late inflammatory changes in the bursae area. Tense, fluid filled mass with warmth and erythema. Seems a little distal for the olecronon bursae, but still possible. Suspect either subcutaneous/bursae clot, now inflamed with healing process dealing with the old extravascular blood clot, vs infection/cellulitis. Cont. Ice and asa. Plan to cover cellulitis with doxycycline bid x 10 days. Recheck if persistent or progressive . An electronic signature was used to authenticate this note.     --Tanisha Lentz MD on 11/9/2020 at 7:11 PM

## 2020-11-11 NOTE — TELEPHONE ENCOUNTER
Pt seen in Urgent Care Monday. Right arm is no better. Still swollen & hot to touch. Daughter Matheus rousseau would like a call back.

## 2020-11-11 NOTE — TELEPHONE ENCOUNTER
May need to come to urgent care or doctor sid, or me, or wound care/surgery, to have this opened up and drained.

## 2020-11-12 NOTE — PROGRESS NOTES
2020     Hailee Dunne (:  1927) is a 80 y.o. female, here for evaluation of the following medical concerns:    HPI   Acute follow up on left elbow mass, suspected as hematoma after a fall. Very inflamed at the site. We covered infection with doxycycline. Seen again last night and added keflex. She hasnt started this. She wanted acute visit to open this up and see what is in it, drain it today. Past Medical History:   Diagnosis Date    Hyperlipidemia     Hypertension     Impaired fasting glucose     Macular degeneration     Osteopenia     Urinary urgency      Past Surgical History:   Procedure Laterality Date    CATARACT REMOVAL WITH IMPLANT Left     COLONOSCOPY  2006    severe diverticulosis       Review of Systems   Constitutional: Negative. Negative for fever. HENT: Negative. Eyes: Negative. Respiratory: Negative. Negative for cough and shortness of breath. Cardiovascular: Negative. Gastrointestinal: Negative. Negative for diarrhea (recent/resolved last wed). Endocrine: Negative. Genitourinary: Negative. Musculoskeletal: Positive for arthralgias (right elbow). Skin: Positive for color change (right elbow). Allergic/Immunologic: Negative. Neurological: Negative. Hematological: Negative. Psychiatric/Behavioral: Negative. Prior to Visit Medications    Medication Sig Taking?  Authorizing Provider   doxycycline hyclate (VIBRA-TABS) 100 MG tablet Take 1 tablet by mouth 2 times daily for 10 days Yes Remi Alston MD   metoprolol tartrate (LOPRESSOR) 25 MG tablet take 1 tablet by mouth twice a day Yes Mele Gentile DO   aspirin 81 MG tablet Take 81 mg by mouth daily Yes Historical Provider, MD   atorvastatin (LIPITOR) 10 MG tablet TAKE ONE TABLET BY MOUTH ONCE DAILY Yes Grace Hodgson MD   timolol (TIMOPTIC) 0.5 % ophthalmic solution Place 1 drop into both eyes every morning Yes Historical Provider, MD   ibuprofen process tenderness noted. Arms:    Neurological:      Mental Status: She is alert. the site has more dull erythema at present. Not as bright red as prior    /64 (Site: Right Upper Arm, Position: Sitting, Cuff Size: Large Adult)   Pulse 72   Temp 98.8 °F (37.1 °C) (Temporal)   Wt 121 lb (54.9 kg)   BMI 24.44 kg/m²     ASSESSMENT/PLAN:    Encounter Diagnosis   Name Primary?  Hematoma Yes     I suspect a hematoma after known trauma. Inflamed, and we are covering infection. Erythema more dull today. No portal of entry for infection. Discussed pros and cons of I&D today with her and dtr. In the end, she is going to observe on current antibiotic and follow up prn concerns. An electronic signature was used to authenticate this note.     --Deandra Shea MD on 11/12/2020 at 9:12 AM

## 2020-11-12 NOTE — PROGRESS NOTES
ophthalmic solution Place 1 drop into both eyes nightly      Multiple Vitamins-Minerals (PRESERVISION AREDS) CAPS Take  by mouth 2 times daily.  Calcium 1500 MG TABS daily. She is allergic to macrobid [nitrofurantoin] and sulfa antibiotics. She  reports that she has never smoked. She has never used smokeless tobacco.      Objective:    Vitals:    11/11/20 2004   BP: 118/78   Pulse: 72   Resp: 16   Temp: 98.2 °F (36.8 °C)   SpO2: 92%   Weight: 118 lb (53.5 kg)   Height: 4' 11\" (1.499 m)     Body mass index is 23.83 kg/m². Well-nourished, well-developed elderly  female, in no acute distress. The right elbow has an area of erythema near the olecranon. There is an inked [place] drawn around the area of erythema. There is an area of erythema outside of the inked [place]. The area of erythema has increased warmth to palpation. The area of erythema is tender to palpation. Assessment and Plan:    Cellulitis of right arm  As it appears that the area of cellulitis has increased, I recommended that she discontinue Doxycycline and begin Keflex:  - cephALEXin (KEFLEX) 500 MG capsule; Take 1 capsule by mouth 3 times daily for 10 days  Dispense: 30 capsule; Refill: 0    She is reluctant to take a different antibiotic, as she is concerned that this may cause diarrhea. After discussion, I advised her to continue Doxycycline. I advised her to begin the Keflex if there is no improvement in the next 24 hours. She was advised to follow up if symptoms worsen or do not resolve.      (Please note that portions of this note were completed with a voice-recognition program. Efforts were made to edit the dictation but occasionally words are mis-transcribed.)

## 2020-11-16 NOTE — PATIENT INSTRUCTIONS
SIGNS OF INFECTION  - Redness, swelling, skin hot  - Wound bed turns black or stringy yellow  - Foul odor  - Increased drainage or pus  - Increased pain  - Fever greater than 100F    CALL YOUR DOCTOR OR SEEK MEDICAL ATTENTION IF SIGNS OF INFECTION.   DO NOT WAIT UNTIL YOUR NEXT APPOINTMENT    Call the Wound Care Nurse with any other questions or concerns- [phone]

## 2020-11-16 NOTE — PROGRESS NOTES
11/3 the patient fell and struck her right elbow. Since that time she has developed redness and pain. Yesterday it began drain blood and serous fluid. She is on aspirin, but otherwise not anticoagulated. She was started on doxycycline for 10 days starting 10/9     /88   Pulse 61   Temp 96.6 °F (35.9 °C) (Tympanic)   Resp 16   Ht 4' 11\" (1.499 m)   Wt 122 lb (55.3 kg)   SpO2 97%   BMI 24.64 kg/m²     On exam she has a mildly erythematous fluctuant mass just distal to her right elbow. There is a 3 mm open draining copious serous fluid. It is tender, but not warm    IMP/PLAN  1) Traumatic hematoma/seroma -  At risk for olecranon bursitis, but it seems a little distal for that at this time. I recommend I&D as it is already open and draining. Local anesthesia was infiltrated over the affected area. An incision was made over the lump, about 2 cm long. Drainage: serous copious - there was also some necrotic fat fragments. The cavity daniel extend to underlying connective tissue and muscle. Wound packed open: No      Antibiotics: already on doxycycline see orders    Follow up: in 1 weeks    Keep wound covered. May shower. Call for increased pain, fever, intractable nausea and vomiting, increasing redness.

## 2020-11-17 PROBLEM — S51.001A OPEN WOUND OF RIGHT ELBOW: Status: ACTIVE | Noted: 2020-01-01

## 2020-11-17 PROBLEM — D64.9 ANEMIA: Status: ACTIVE | Noted: 2020-01-01

## 2020-11-17 NOTE — PROGRESS NOTES
Patient fell and had a hematoma in the right elbow. This was opened by Dr. Clay Late yesterday. There was old clot in the drainage. Patient did well and had less pain but it started to ooze through the night and she had a slow steady  Bleed. Daughter brought her today to have it checked. It was seen by the nurse and it kept oozing. She held pressure for 10 to 15 minutes and it kept oozing. She asked me to take a look at it. When we looked at it it was oozing slightly from the proximal corner. We irrigated it and remove some of the old clot and put a suture in the corner. We held pressure for another 5 minutes. We then packed it and it appeared to not be bleeding. After we washed it for 5 minutes it started ooze again. We put silver nitrate on the skin edges. We did numbed the area with 1% lidocaine. It looks like there was very minimal oozing at this time and we then packed it tightly with iodoform gauze. We then placed a 4 x 4 on top of it and a Coban dressing lightly. We will have her see stop in tomorrow so we can take a look at it may remove the Coban.

## 2020-11-17 NOTE — PROGRESS NOTES
[facility]  [address] [place] [address]  Dept: [phone]  Dept Fax: [phone]  Loc: [phone]     Jeannine [address] is a 80 y.o. female who presents today for her medical conditions/complaintsas noted below. [address] [place] [address] is c/o of   Chief Complaint   Patient presents with    Hypertension     3 month    Hyperlipidemia    Other     thrombocytosis         HPI:     Hypertension   This is a chronic (essential htn) problem. The current episode started more than 1 year ago. The problem has been waxing and waning since onset. The problem is controlled. Pertinent negatives include no chest pain, headaches, neck pain, palpitations or shortness of breath. Hyperlipidemia   This is a chronic problem. The current episode started more than 1 year ago. The problem is controlled. Recent lipid tests were reviewed and are variable. Pertinent negatives include no chest pain or shortness of breath. Other   This is a chronic (3-thrombocytosis,   4-anemia) problem. The current episode started more than 1 month ago. The problem occurs constantly. The problem has been waxing and waning. Pertinent negatives include no abdominal pain, arthralgias, chest pain, chills, coughing, fever, headaches, nausea, neck pain, numbness, rash, vomiting or weakness.        No results found for: LABA1C         No results found for: LABMICR  LDL Cholesterol (mg/dL)   Date Value   11/12/2020 35   04/23/2019 44   04/18/2018 42         AST (U/L)   Date Value   11/12/2020 26     ALT (U/L)   Date Value   11/12/2020 21     BUN (mg/dL)   Date Value   11/12/2020 14     BP Readings from Last 3 Encounters:   11/17/20 122/80   11/16/20 136/88   11/12/20 118/64              Past Medical History:   Diagnosis Date    Hyperlipidemia     Hypertension     Impaired fasting glucose     Macular degeneration     Osteopenia     Urinary urgency       Past Surgical History:   Procedure Laterality Date    CATARACT REMOVAL WITH IMPLANT Left     COLONOSCOPY  11/2006    severe diverticulosis       Family History   Problem Relation Age of Onset    Cancer Mother         leukemia    Heart Disease Father     Heart Attack Father     Cancer Brother         unknown          Social History     Tobacco Use    Smoking status: Never Smoker    Smokeless tobacco: Never Used   Substance Use Topics    Alcohol use: No         Current Outpatient Medications   Medication Sig Dispense Refill    hydroxyurea (HYDREA) 500 MG chemo capsule Take 1 capsule by mouth daily 30 capsule 3    doxycycline hyclate (VIBRA-TABS) 100 MG tablet Take 1 tablet by mouth 2 times daily for 10 days 20 tablet 0    metoprolol tartrate (LOPRESSOR) 25 MG tablet take 1 tablet by mouth twice a day 180 tablet 3    aspirin 81 MG tablet Take 81 mg by mouth daily      atorvastatin (LIPITOR) 10 MG tablet TAKE ONE TABLET BY MOUTH ONCE DAILY 90 tablet 3    timolol (TIMOPTIC) 0.5 % ophthalmic solution Place 1 drop into both eyes every morning  6    ibuprofen (ADVIL;MOTRIN) 200 MG tablet Take 200 mg by mouth every 8 hours as needed for Pain       Cholecalciferol (VITAMIN D3) 2000 units CAPS Take 2,000 Units by mouth daily      latanoprost (XALATAN) 0.005 % ophthalmic solution Place 1 drop into both eyes nightly      Multiple Vitamins-Minerals (PRESERVISION AREDS) CAPS Take  by mouth 2 times daily.  Calcium 1500 MG TABS daily.  cephALEXin (KEFLEX) 500 MG capsule Take 1 capsule by mouth 3 times daily for 10 days (Patient not taking: Reported on 11/17/2020) 30 capsule 0    Handicap Placard MISC by Does not apply route Exp 10/30/2021 1 each 0     No current facility-administered medications for this visit.       Allergies   Allergen Reactions    Macrobid [Nitrofurantoin] Rash    Sulfa Antibiotics Rash       Health Maintenance   Topic Date Due    Shingles Vaccine (1 of 2) 01/26/1977    Lipid screen Cervical: No cervical adenopathy. Skin:     General: Skin is warm and dry. Findings: No rash. Neurological:      Mental Status: She is alert and oriented to person, place, and time. Cranial Nerves: No cranial nerve deficit (grossly). Psychiatric:         Thought Content: Thought content normal.        /80 (Site: Left Upper Arm, Position: Sitting, Cuff Size: Medium Adult)   Pulse 55   Resp 14   Ht 5' (1.524 m)   Wt 122 lb (55.3 kg)   BMI 23.83 kg/m²     Assessment:       Diagnosis Orders   1. Essential hypertension     2. Essential thrombocytosis (HCC)  hydroxyurea (HYDREA) 500 MG chemo capsule    CBC With Auto Differential   3. Other hyperlipidemia     4. Anemia, unspecified type  Hemoglobin    Iron And TIBC    CBC With Auto Differential   5. Excessive bleeding  Protime-INR    APTT   6. Coagulopathy ([address]  APTT             Plan:       Return in about 1 month (around 12/17/2020) for Hypertension, Hyperlipidemia, thrombocytosis. Orders Placed This Encounter   Procedures    Hemoglobin     Standing Status:   Future     Standing Expiration Date:   11/17/2021    Iron And TIBC     Standing Status:   Future     Standing Expiration Date:   11/17/2021     Order Specific Question:   Is Patient Fasting? Answer:   na     Order Specific Question:   No of Hours? Answer:   na    Protime-INR     Standing Status:   Future     Standing Expiration Date:   11/17/2021     Order Specific Question:   Daily Coumadin Dose? Answer:   no    APTT     Standing Status:   Future     Standing Expiration Date:   11/17/2021     Order Specific Question:   Daily Heparin Dose?      Answer:   no    CBC With Auto Differential     Standing Status:   Standing     Number of Occurrences:   4     Standing Expiration Date:   11/17/2021     Orders Placed This Encounter   Medications    hydroxyurea (HYDREA) 500 MG chemo capsule     Sig: Take 1 capsule by mouth daily     Dispense:  30 capsule     Refill:  3 Patientgiven educational materials - see patient instructions. Discussed use, benefit,and side effects of prescribed medications. All patient questions answered. Ptvoiced understanding. Reviewed health maintenance. Instructed to continue currentmedications, diet and exercise. Patient agreed with treatment plan. Follow up asdirected.      Electronically signed by Keegan Warner MD on 11/17/2020 at 2:52 PM

## 2020-11-17 NOTE — TELEPHONE ENCOUNTER
Patient's daughter, [place], called office again because she is very concerned about her mother's elbow draining. Please call her back at [phone].

## 2020-11-17 NOTE — TELEPHONE ENCOUNTER
Spoke with patients daughter and she is bringing the patient to the clinic to have the incision looked at.

## 2020-11-17 NOTE — TELEPHONE ENCOUNTER
Patient's daughter Magaly Gross called the office asking to speak to Melissa Torres. Daughter states her mother's elbow has a huge amount of drainage coming from it. Daughter is very concerned about the amount. Daughter would like to speak to Melissa Torres ASAP. Call back #[phone]

## 2020-11-18 NOTE — PATIENT INSTRUCTIONS
Wet to dry dressing change to right elbow wound once a day. Follow up with Dr. Neris Cain in one week.

## 2020-11-18 NOTE — PROGRESS NOTES
Wet to dry dressing applied to right elbow wound. Patient tolerated well. Spoke with patient's neighbor, Milesfozia Haro over the phone and gave instructions on wet to dry dressing change. She will do dressing changes for patient .

## 2020-11-24 NOTE — ADDENDUM NOTE
Encounter addended by: Lin Acuna RN on: 11/24/2020 9:54 AM   Actions taken: Edited Discharge Instructions

## 2020-11-24 NOTE — ADDENDUM NOTE
Encounter addended by: Luisa Moore RN on: 11/24/2020 10:00 AM   Actions taken: Flowsheet accepted, Diagnosis association updated, Order list changed, Charge Capture section accepted

## 2020-11-25 NOTE — ADDENDUM NOTE
Addended by: Maxine Oliveira on: 11/25/2020 10:04 AM     Modules accepted: Level of Service, SmartSet

## 2020-11-25 NOTE — PROGRESS NOTES
This encounter was created in error - please disregard. Appointment was converted to appointment with Dr Eusebio Reynolds.

## 2020-12-04 PROBLEM — R60.0 BILATERAL LEG EDEMA: Status: ACTIVE | Noted: 2020-01-01

## 2020-12-04 NOTE — PROGRESS NOTES
HarjinderRustonleana  [address] [place] [address]  Dept: [phone]  Dept Fax: [phone]  Loc: [phone]     Julieta Avelino [address] is a 80 y.o. female who presents today for her medical conditions/complaintsas noted below. [address] [place] [address] is c/o of   Chief Complaint   Patient presents with    Leg Swelling     x's 4 weeks     Shortness of Breath     Upon exertion    Fatigue         HPI:     Shortness of Breath   This is a new (Dyspnea on exertion) problem. The current episode started 1 to 4 weeks ago. The problem occurs intermittently. The problem has been waxing and waning. Pertinent negatives include no abdominal pain, chest pain, fever, headaches, leg swelling, neck pain, rash or vomiting. Fatigue   This is a recurrent problem. The current episode started 1 to 4 weeks ago. The problem occurs intermittently. The problem has been waxing and waning. Associated symptoms include fatigue. Pertinent negatives include no abdominal pain, arthralgias, chest pain, chills, coughing, fever, headaches, nausea, neck pain, numbness, rash, vomiting or weakness. Other   This is a new (3-bilateral leg edema) problem. The current episode started 1 to 4 weeks ago. The problem occurs constantly. The problem has been waxing and waning. Associated symptoms include fatigue. Pertinent negatives include no abdominal pain, arthralgias, chest pain, chills, coughing, fever, headaches, nausea, neck pain, numbness, rash, vomiting or weakness.        No results found for: LABA1C         No results found for: LABMICR  LDL Cholesterol (mg/dL)   Date Value   11/12/2020 35   04/23/2019 44   04/18/2018 42         AST (U/L)   Date Value   11/12/2020 26     ALT (U/L)   Date Value   11/12/2020 21     BUN (mg/dL)   Date Value   11/12/2020 14     BP Readings from Last 3 Encounters:   12/04/20 (!) 142/92   11/24/20 138/64   11/18/20 130/70              Past Medical History:   Diagnosis Date    Hyperlipidemia     Hypertension     Impaired fasting glucose     Macular degeneration     Osteopenia     Urinary urgency       Past Surgical History:   Procedure Laterality Date    CATARACT REMOVAL WITH IMPLANT Left     COLONOSCOPY  11/2006    severe diverticulosis       Family History   Problem Relation Age of Onset    Cancer Mother         leukemia    Heart Disease Father     Heart Attack Father     Cancer Brother         unknown          Social History     Tobacco Use    Smoking status: Never Smoker    Smokeless tobacco: Never Used   Substance Use Topics    Alcohol use: No         Current Outpatient Medications   Medication Sig Dispense Refill    furosemide (LASIX) 20 MG tablet Take one tablet daily for three days and report if better 30 tablet 3    metoprolol tartrate (LOPRESSOR) 25 MG tablet take 1 tablet by mouth twice a day 180 tablet 3    aspirin 81 MG tablet Take 81 mg by mouth daily      atorvastatin (LIPITOR) 10 MG tablet TAKE ONE TABLET BY MOUTH ONCE DAILY 90 tablet 3    timolol (TIMOPTIC) 0.5 % ophthalmic solution Place 1 drop into both eyes every morning  6    Cholecalciferol (VITAMIN D3) 2000 units CAPS Take 2,000 Units by mouth daily      latanoprost (XALATAN) 0.005 % ophthalmic solution Place 1 drop into both eyes nightly      Multiple Vitamins-Minerals (PRESERVISION AREDS) CAPS Take  by mouth 2 times daily.  Calcium 1500 MG TABS daily.  hydroxyurea (HYDREA) 500 MG chemo capsule Take 1 capsule by mouth daily (Patient not taking: Reported on 12/4/2020) 30 capsule 3    ibuprofen (ADVIL;MOTRIN) 200 MG tablet Take 200 mg by mouth every 8 hours as needed for Pain       Handicap Placard MISC by Does not apply route Exp 10/30/2021 1 each 0     No current facility-administered medications for this visit.       Allergies   Allergen Reactions    Macrobid [Nitrofurantoin] Rash    Sulfa Antibiotics Rash       Health Maintenance   Topic Date Due    Shingles Vaccine (1 of 2) 01/26/1977    Lipid screen  11/12/2021    DTaP/Tdap/Td vaccine (2 - Td) 02/22/2030    Flu vaccine  Completed    Pneumococcal 65+ years Vaccine  Completed    Hepatitis A vaccine  Aged Out    Hepatitis B vaccine  Aged Out    Hib vaccine  Aged Out    Meningococcal (ACWY) vaccine  Aged Out       Subjective:      Review of Systems   Constitutional: Positive for fatigue. Negative for chills and fever. HENT: Negative for hearing loss. Eyes: Negative for pain and visual disturbance. Respiratory: Positive for shortness of breath. Negative for cough. Cardiovascular: Negative for chest pain, palpitations and leg swelling. Gastrointestinal: Negative for abdominal pain, blood in stool, constipation, diarrhea, nausea and vomiting. Endocrine: Negative for cold intolerance, polydipsia and polyuria. Genitourinary: Negative for difficulty urinating, dysuria and hematuria. Musculoskeletal: Negative for arthralgias, back pain, gait problem and neck pain. Skin: Negative for pallor and rash. Neurological: Negative for dizziness, weakness, numbness and headaches. Hematological: Negative for adenopathy. Does not bruise/bleed easily. Psychiatric/Behavioral: Negative for confusion. The patient is not nervous/anxious. Objective:     Physical Exam  Vitals signs reviewed. Constitutional:       Appearance: She is well-developed. HENT:      Head: Normocephalic and atraumatic. Eyes:      Pupils: Pupils are equal, round, and reactive to light. Neck:      Musculoskeletal: Neck supple. Cardiovascular:      Rate and Rhythm: Normal rate and regular rhythm. Heart sounds: No murmur. No friction rub. No gallop. Pulmonary:      Effort: Pulmonary effort is normal.      Breath sounds: Normal breath sounds. No wheezing or rales. Abdominal:      General: There is no distension. Palpations: Abdomen is soft. There is no mass. Tenderness:  There is no abdominal tenderness. There is no rebound. Musculoskeletal: Normal range of motion. Left lower leg: Edema (Leg edema, symmetrical and bilateral) present. Lymphadenopathy:      Cervical: No cervical adenopathy. Skin:     General: Skin is warm and dry. Findings: No rash. Neurological:      Mental Status: She is alert and oriented to person, place, and time. Cranial Nerves: No cranial nerve deficit (grossly). Psychiatric:         Thought Content: Thought content normal.        BP (!) 142/92 (Site: Left Upper Arm, Position: Sitting, Cuff Size: Medium Adult)   Pulse 81   Temp 99.3 °F (37.4 °C) (Temporal)   Resp 24   Ht 5' (1.524 m)   Wt 129 lb 6.4 oz (58.7 kg)   SpO2 93% Comment: RA  BMI 25.27 kg/m²     Assessment:       Diagnosis Orders   1. SOB (shortness of breath) on exertion     2. Bilateral leg edema  furosemide (LASIX) 20 MG tablet    Basic Metabolic Panel   3. Other fatigue  CBC Auto Differential    CBC Auto Differential      Patient told she can use Lasix daily for 3 days in a row and then see how she is doing. If she is doing well she can use the Lasix on a as needed basis however if she frequently is needing 3 days in a row of Lasix then we may just leave her on it constantly. We will also check a BMP and CBC with differential today. She was also told if she develops any worsening shortness of breath fever or other symptoms that she will need to go into urgent care/ER over the weekend etc.       Plan:       Return if symptoms worsen or fail to improve, for Hypertension, Hyperlipidemia, Bilateral leg edema.     Orders Placed This Encounter   Procedures    CBC Auto Differential     Standing Status:   Future     Standing Expiration Date:   12/4/2021    Basic Metabolic Panel     Standing Status:   Future     Standing Expiration Date:   12/4/2021    CBC Auto Differential     Standing Status:   Future     Standing Expiration Date:   6/4/2021     Orders Placed This Encounter

## 2020-12-08 NOTE — TELEPHONE ENCOUNTER
Notified patients daughter Jose C Murguia of medication changes and instructions.   Voiced understanding

## 2020-12-08 NOTE — TELEPHONE ENCOUNTER
Double the Lasix by taking 2 of the 20 mg for total of 40 mg daily for 3 days,   and then go back to one of the 20 mg daily continuously.

## 2020-12-08 NOTE — PLAN OF CARE
Problem: Wound:  Goal: Will show signs of wound healing; wound closure and no evidence of infection  Description: Will show signs of wound healing; wound closure and no evidence of infection  Outcome: Ongoing     Problem: Weight control:  Goal: Ability to maintain an optimal weight for height and age will be supported  Description: Ability to maintain an optimal weight for height and age will be supported  Outcome: Ongoing     Problem: Falls - Risk of:  Goal: Will remain free from falls  Description: Will remain free from falls  Outcome: Ongoing

## 2020-12-17 NOTE — PROGRESS NOTES
Cervical: No cervical adenopathy. Skin:     General: Skin is warm and dry. Findings: No rash. Neurological:      Mental Status: She is alert and oriented to person, place, and time. Cranial Nerves: No cranial nerve deficit (grossly). Psychiatric:         Thought Content: Thought content normal.        /72 (Site: Left Upper Arm, Position: Sitting, Cuff Size: Medium Adult)   Pulse 73   Resp 16   Ht 4' 10\" (1.473 m)   Wt 123 lb (55.8 kg)   BMI 25.71 kg/m²     Assessment:       Diagnosis Orders   1. Routine general medical examination at a health care facility     2. Essential hypertension     3. Essential thrombocytosis (HCC)  CBC With Auto Differential    CBC With Auto Differential   4. Other hyperlipidemia     5. Medicare annual wellness visit, subsequent     6. Congestive heart failure, unspecified HF chronicity, unspecified heart failure type [facility])  Ortiz Mcgregor MD, Cardiology, [place]             Plan:       Return in about 2 months (around 2/17/2021) for Hypertension, Hyperlipidemia , thrombocytosis. Orders Placed This Encounter   Procedures    CBC With Auto Differential     Standing Status:   Future     Standing Expiration Date:   12/17/2021    CBC With Auto Differential     Standing Status:   Future     Standing Expiration Date:   12/17/2021   Penelope Flower MD, Cardiology, [place]     Referral Priority:   Routine     Referral Type:   Eval and Treat     Referral Reason:   Specialty Services Required     Referred to Provider:   Carter Rondon DO     Requested Specialty:   Cardiology     Number of Visits Requested:   1     No orders of the defined types were placed in this encounter. Patientgiven educational materials - see patient instructions. Discussed use, benefit,and side effects of prescribed medications. All patient questions answered. Ptvoiced understanding. Reviewed health maintenance. Instructed to continue currentmedications, diet and exercise. Patient agreed with treatment plan. Follow up asdirected.      Electronically signed by Moshe Arteaga MD on 12/17/2020 at 3:06 PM

## 2020-12-17 NOTE — PATIENT INSTRUCTIONS
Personalized Preventive Plan for Kristal Rolon - 12/17/2020  Medicare offers a range of preventive health benefits. Some of the tests and screenings are paid in full while other may be subject to a deductible, co-insurance, and/or copay. Some of these benefits include a comprehensive review of your medical history including lifestyle, illnesses that may run in your family, and various assessments and screenings as appropriate. After reviewing your medical record and screening and assessments performed today your provider may have ordered immunizations, labs, imaging, and/or referrals for you. A list of these orders (if applicable) as well as your Preventive Care list are included within your After Visit Summary for your review. Other Preventive Recommendations:    · A preventive eye exam performed by an eye specialist is recommended every 1-2 years to screen for glaucoma; cataracts, macular degeneration, and other eye disorders. · A preventive dental visit is recommended every 6 months. · Try to get at least 150 minutes of exercise per week or 10,000 steps per day on a pedometer . · Order or download the FREE \"Exercise & Physical Activity: Your Everyday Guide\" from [employer]. Call 9-790.638.8497 or search [employer] online. · You need 8724-8604 mg of calcium and 3814-7331 IU of vitamin D per day. It is possible to meet your calcium requirement with diet alone, but a vitamin D supplement is usually necessary to meet this goal.  · When exposed to the sun, use a sunscreen that protects against both UVA and UVB radiation with an SPF of 30 or greater. Reapply every 2 to 3 hours or after sweating, drying off with a towel, or swimming. · Always wear a seat belt when traveling in a car. Always wear a helmet when riding a bicycle or motorcycle.

## 2020-12-17 NOTE — PROGRESS NOTES
Medicare Annual Wellness Visit  Name: Shauna Elmore Date: 2020   MRN: [id number] Sex: Female   Age: 80 y.o. Ethnicity: Non-/Non    : 1927 Race: Arnoldo Briscoe is here for Medicare AWV, Hypertension, Hyperlipidemia, and Other (thrombocytosis)    Screenings for behavioral, psychosocial and functional/safety risks, and cognitive dysfunction are all negative except as indicated below. These results, as well as other patient data from the [address] form, are documented in Flowsheets linked to this Encounter. Allergies   Allergen Reactions    Macrobid [Nitrofurantoin] Rash    Sulfa Antibiotics Rash         Prior to Visit Medications    Medication Sig Taking? Authorizing Provider   furosemide (LASIX) 20 MG tablet Take one tablet daily for three days and report if better Yes Artemio Cisneros MD   metoprolol tartrate (LOPRESSOR) 25 MG tablet take 1 tablet by mouth twice a day Yes Mele Gentile DO   aspirin 81 MG tablet Take 81 mg by mouth daily Yes Historical Provider, MD   atorvastatin (LIPITOR) 10 MG tablet TAKE ONE TABLET BY MOUTH ONCE DAILY Yes Artemio Cisneros MD   timolol (TIMOPTIC) 0.5 % ophthalmic solution Place 1 drop into both eyes every morning Yes Historical Provider, MD   Cholecalciferol (VITAMIN D3) 2000 units CAPS Take 2,000 Units by mouth daily Yes Historical Provider, MD   latanoprost (XALATAN) 0.005 % ophthalmic solution Place 1 drop into both eyes nightly Yes Historical Provider, MD   Calcium 1500 MG TABS daily.  Yes Historical Provider, MD   hydroxyurea (HYDREA) 500 MG chemo capsule Take 1 capsule by mouth daily  Patient not taking: Reported on 2020  Artemio Cisneros MD   ibuprofen (ADVIL;MOTRIN) 200 MG tablet Take 200 mg by mouth every 8 hours as needed for Pain   Historical Provider, MD   Handicap Placard MISC by Does not apply route Exp 10/30/2021  Artemio Cisneros MD Multiple Vitamins-Minerals (PRESERVISION AREDS) CAPS Take  by mouth 2 times daily. Historical Provider, MD         Past Medical History:   Diagnosis Date    Hyperlipidemia     Hypertension     Impaired fasting glucose     Macular degeneration     Osteopenia     Urinary urgency        Past Surgical History:   Procedure Laterality Date    CATARACT REMOVAL WITH IMPLANT Left     COLONOSCOPY  11/2006    severe diverticulosis         Family History   Problem Relation Age of Onset    Cancer Mother         leukemia    Heart Disease Father     Heart Attack Father     Cancer Brother         unknown       CareTeam (Including outside providers/suppliers regularly involved in providing care):   Patient Care Team:  Sonia Hernandez MD as PCP - General (Internal Medicine)  Sonia Hernandez MD as PCP - [facility] Jessica Borges Provider    Wt Readings from Last 3 Encounters:   12/17/20 123 lb (55.8 kg)   12/08/20 122 lb (55.3 kg)   12/04/20 129 lb 6.4 oz (58.7 kg)     Vitals:    12/17/20 1449   BP: 112/72   Site: Left Upper Arm   Position: Sitting   Cuff Size: Medium Adult   Pulse: 73   Resp: 16   Weight: 123 lb (55.8 kg)   Height: 4' 10\" (1.473 m)     Body mass index is 25.71 kg/m². Based upon direct observation of the patient, evaluation of cognition reveals nopne. Patient's complete Health Risk Assessment and screening values have been reviewed and are found in Flowsheets. The following problems were reviewed today and where indicated follow up appointments were made and/or referrals ordered.     Positive Risk Factor Screenings with Interventions:     Fall Risk:  2 or more falls in past year?: no  Fall with injury in past year?: (!) yes  Fall Risk Interventions:    · back in may passed out and broke her ankle          General Health and ACP:  General  In general, how would you say your health is?: Good In the past 7 days, have you experienced any of the following? New or Increased Pain, New or Increased Fatigue, Loneliness, Social Isolation, Stress or Anger?: None of These  Do you get the social and emotional support that you need?: Yes  Do you have a Living Will?: Yes  Advance Directives     Power of Chon Jacob Will ACP-Advance Directive ACP-Power of     Not on File Not on File Not on File Not on File      General Health Risk Interventions:  · na    Health Habits/Nutrition:  Health Habits/Nutrition  Do you exercise for at least 20 minutes 2-3 times per week?: (!) No  Have you lost any weight without trying in the past 3 months?: No  Do you eat fewer than 2 meals per day?: No  Have you seen a dentist within the past year?: (!) No  Body mass index: (!) 25.7  Health Habits/Nutrition Interventions:  · Inadequate physical activity:  declines excercising   · Declines dental exam at this time, has natural teeth    Hearing/Vision:  No exam data present  Hearing/Vision  Do you or your family notice any trouble with your hearing?: (!) Yes  Do you have difficulty driving, watching TV, or doing any of your daily activities because of your eyesight?: No  Have you had an eye exam within the past year?: Yes  Hearing/Vision Interventions:  · Hearing concerns:  has hearing aides     ADL:  ADLs  In the past 7 days, did you need help from others to perform any of the following everyday activities? Eating, dressing, grooming, bathing, toileting, or walking/balance?: None  In the past 7 days, did you need help from others to take care of any of the following?  Laundry, housekeeping, banking/finances, shopping, telephone use, food preparation, transportation, or taking medications?: (!) Banking/Finances  ADL Interventions:  · Daughter helps with certain ADLs    Personalized Preventive Plan   Current Health Maintenance Status  Immunization History   Administered Date(s) Administered  Influenza Vaccine, unspecified formulation 10/25/2014    Influenza Virus Vaccine 10/21/2010, 11/03/2011, 10/16/2012    Influenza, High Dose (Fluzone 65 yrs and older) 10/21/2013, 10/15/2015, 10/20/2016, 10/24/2017, 10/30/2018    Influenza, Quadv, adjuvanted, 65 yrs +, IM, PF (Fluad) 09/25/2020    Influenza, Triv, inactivated, subunit, adjuvanted, IM (Fluad 65 yrs and older) 10/25/2019    Pneumococcal Conjugate 13-valent ([id number]) 10/20/2016    Pneumococcal Polysaccharide ([id number]) 10/09/2014    Tetanus 06/02/2001        Health Maintenance   Topic Date Due    Shingles Vaccine (1 of 2) 01/26/1977    Lipid screen  11/12/2021    Potassium monitoring  12/04/2021    Creatinine monitoring  12/04/2021    DTaP/Tdap/Td vaccine (2 - Td) 02/22/2030    Flu vaccine  Completed    Pneumococcal 65+ years Vaccine  Completed    Hepatitis A vaccine  Aged Out    Hepatitis B vaccine  Aged Out    Hib vaccine  Aged Out    Meningococcal (ACWY) vaccine  Aged Out     Recommendations for [employer] Due: see orders and patient instructions/AVS.  . Recommended screening schedule for the next 5-10 years is provided to the patient in written form: see Patient Instructions/AVS.    IKorey LPN, [id number], performed the documented evaluation under the direct supervision of the attending physician. I, Dr. Nitesh Wong, directly supervised the performance of this Medicare annual wellness visit.

## 2020-12-22 NOTE — PLAN OF CARE
Problem: Wound:  Goal: Will show signs of wound healing; wound closure and no evidence of infection  Description: Will show signs of wound healing; wound closure and no evidence of infection  Outcome: Completed     Problem: Falls - Risk of:  Goal: Will remain free from falls  Description: Will remain free from falls  Outcome: Completed

## 2021-01-01 ENCOUNTER — HOSPITAL ENCOUNTER (OUTPATIENT)
Dept: LAB | Age: 86
Discharge: HOME | End: 2021-01-08
Payer: MEDICARE

## 2021-01-01 ENCOUNTER — APPOINTMENT (OUTPATIENT)
Dept: GENERAL RADIOLOGY | Age: 86
DRG: 689 | End: 2021-01-01
Payer: MEDICARE

## 2021-01-01 ENCOUNTER — HOSPITAL ENCOUNTER (OUTPATIENT)
Dept: NON INVASIVE DIAGNOSTICS | Age: 86
Discharge: HOME | End: 2021-01-13
Payer: MEDICARE

## 2021-01-01 ENCOUNTER — OFFICE VISIT (OUTPATIENT)
Dept: CARDIOLOGY | Age: 86
End: 2021-01-01
Payer: MEDICARE

## 2021-01-01 ENCOUNTER — TELEPHONE (OUTPATIENT)
Dept: INTERNAL MEDICINE | Age: 86
End: 2021-01-01

## 2021-01-01 ENCOUNTER — APPOINTMENT (OUTPATIENT)
Dept: INTERVENTIONAL RADIOLOGY/VASCULAR | Age: 86
DRG: 689 | End: 2021-01-01
Payer: MEDICARE

## 2021-01-01 ENCOUNTER — HOSPITAL ENCOUNTER (OUTPATIENT)
Dept: LAB | Age: 86
Discharge: HOME | End: 2021-01-22
Payer: MEDICARE

## 2021-01-01 ENCOUNTER — TELEPHONE (OUTPATIENT)
Dept: CARDIOLOGY | Age: 86
End: 2021-01-01

## 2021-01-01 ENCOUNTER — HOSPITAL ENCOUNTER (INPATIENT)
Age: 86
LOS: 3 days | Discharge: NURSING, REHAB OR LONG-TERM CARE | DRG: 689 | End: 2021-01-30
Attending: EMERGENCY MEDICINE | Admitting: INTERNAL MEDICINE
Payer: MEDICARE

## 2021-01-01 ENCOUNTER — APPOINTMENT (OUTPATIENT)
Dept: CT IMAGING | Age: 86
DRG: 689 | End: 2021-01-01
Payer: MEDICARE

## 2021-01-01 VITALS
HEART RATE: 74 BPM | WEIGHT: 116 LBS | SYSTOLIC BLOOD PRESSURE: 124 MMHG | HEIGHT: 58 IN | BODY MASS INDEX: 24.35 KG/M2 | DIASTOLIC BLOOD PRESSURE: 58 MMHG

## 2021-01-01 VITALS
WEIGHT: 130.2 LBS | DIASTOLIC BLOOD PRESSURE: 80 MMHG | TEMPERATURE: 97.7 F | HEIGHT: 62 IN | BODY MASS INDEX: 23.96 KG/M2 | OXYGEN SATURATION: 100 % | RESPIRATION RATE: 20 BRPM | SYSTOLIC BLOOD PRESSURE: 121 MMHG | HEART RATE: 108 BPM

## 2021-01-01 VITALS
WEIGHT: 120 LBS | HEART RATE: 67 BPM | BODY MASS INDEX: 25.19 KG/M2 | SYSTOLIC BLOOD PRESSURE: 151 MMHG | HEIGHT: 58 IN | DIASTOLIC BLOOD PRESSURE: 67 MMHG

## 2021-01-01 DIAGNOSIS — D64.9 ANEMIA, UNSPECIFIED TYPE: ICD-10-CM

## 2021-01-01 DIAGNOSIS — R77.8 ELEVATED TROPONIN: ICD-10-CM

## 2021-01-01 DIAGNOSIS — R06.02 SOB (SHORTNESS OF BREATH): ICD-10-CM

## 2021-01-01 DIAGNOSIS — D47.3 ESSENTIAL THROMBOCYTOSIS (HCC): Primary | ICD-10-CM

## 2021-01-01 DIAGNOSIS — N30.00 ACUTE CYSTITIS WITHOUT HEMATURIA: ICD-10-CM

## 2021-01-01 DIAGNOSIS — I10 ESSENTIAL HYPERTENSION: Primary | ICD-10-CM

## 2021-01-01 DIAGNOSIS — R07.9 CHEST PAIN, UNSPECIFIED TYPE: ICD-10-CM
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
## 2021-01-01 DIAGNOSIS — D47.3 ESSENTIAL THROMBOCYTOSIS (HCC): ICD-10-CM

## 2021-01-01 DIAGNOSIS — I10 ESSENTIAL HYPERTENSION: ICD-10-CM

## 2021-01-01 DIAGNOSIS — R06.02 SOB (SHORTNESS OF BREATH): Primary | ICD-10-CM

## 2021-01-01 DIAGNOSIS — I34.0 NONRHEUMATIC MITRAL VALVE REGURGITATION: ICD-10-CM

## 2021-01-01 LAB
-: ABNORMAL
-: NORMAL
ABSOLUTE EOS #: 0 K/UL (ref 0–0.44)
ABSOLUTE EOS #: 0.09 K/UL (ref 0–0.44)
ABSOLUTE EOS #: 0.32 K/UL (ref 0–0.44)
ABSOLUTE EOS #: 0.44 K/UL (ref 0–0.44)
ABSOLUTE IMMATURE GRANULOCYTE: 0 K/UL (ref 0–0.3)
ABSOLUTE IMMATURE GRANULOCYTE: 0 K/UL (ref 0–0.3)
ABSOLUTE IMMATURE GRANULOCYTE: 0.06 K/UL (ref 0–0.3)
ABSOLUTE IMMATURE GRANULOCYTE: 0.08 K/UL (ref 0–0.3)
ABSOLUTE IMMATURE GRANULOCYTE: 0.09 K/UL (ref 0–0.3)
ABSOLUTE IMMATURE GRANULOCYTE: 0.17 K/UL (ref 0–0.3)
ABSOLUTE LYMPH #: 1.16 K/UL (ref 1.1–3.7)
ABSOLUTE LYMPH #: 1.18 K/UL (ref 1.1–3.7)
ABSOLUTE LYMPH #: 1.44 K/UL (ref 1.1–3.7)
ABSOLUTE LYMPH #: 1.46 K/UL (ref 1.1–3.7)
ABSOLUTE LYMPH #: 2.61 K/UL (ref 1.1–3.7)
ABSOLUTE LYMPH #: 4.22 K/UL (ref 1.1–3.7)
ABSOLUTE MONO #: 0.18 K/UL (ref 0.1–1.2)
ABSOLUTE MONO #: 0.25 K/UL (ref 0.1–1.2)
ABSOLUTE MONO #: 0.36 K/UL (ref 0.1–1.2)
ABSOLUTE MONO #: 0.4 K/UL (ref 0.1–1.2)
ABSOLUTE MONO #: 1.19 K/UL (ref 0.1–1.2)
ABSOLUTE MONO #: 1.22 K/UL (ref 0.1–1.2)
ALBUMIN SERPL-MCNC: 3.6 G/DL (ref 3.5–5.2)
ALBUMIN/GLOBULIN RATIO: 1.2 (ref 1–2.5)
ALP BLD-CCNC: 58 U/L (ref 35–104)
ALT SERPL-CCNC: 17 U/L (ref 5–33)
AMORPHOUS: ABNORMAL
ANION GAP SERPL CALCULATED.3IONS-SCNC: 10 MMOL/L (ref 9–17)
ANION GAP SERPL CALCULATED.3IONS-SCNC: 12 MMOL/L (ref 9–17)
ANION GAP SERPL CALCULATED.3IONS-SCNC: 13 MMOL/L (ref 9–17)
ANION GAP SERPL CALCULATED.3IONS-SCNC: 15 MMOL/L (ref 9–17)
ANION GAP SERPL CALCULATED.3IONS-SCNC: 9 MMOL/L (ref 9–17)
ANION GAP SERPL CALCULATED.3IONS-SCNC: 9 MMOL/L (ref 9–17)
AST SERPL-CCNC: 54 U/L
BACTERIA: ABNORMAL
BASOPHILS # BLD: 0 % (ref 0–2)
BASOPHILS # BLD: 1 % (ref 0–2)
BASOPHILS # BLD: 2 % (ref 0–2)
BASOPHILS # BLD: 3 % (ref 0–2)
BASOPHILS ABSOLUTE: 0 K/UL (ref 0–0.2)
BASOPHILS ABSOLUTE: 0.08 K/UL (ref 0–0.2)
BASOPHILS ABSOLUTE: 0.22 K/UL (ref 0–0.2)
BASOPHILS ABSOLUTE: 0.24 K/UL (ref 0–0.2)
BILIRUB SERPL-MCNC: 2.81 MG/DL (ref 0.3–1.2)
BILIRUBIN URINE: NEGATIVE
BNP INTERPRETATION: ABNORMAL
BUN BLDV-MCNC: 22 MG/DL (ref 8–23)
BUN BLDV-MCNC: 24 MG/DL (ref 8–23)
BUN BLDV-MCNC: 26 MG/DL (ref 8–23)
BUN BLDV-MCNC: 27 MG/DL (ref 8–23)
BUN BLDV-MCNC: 28 MG/DL (ref 8–23)
BUN BLDV-MCNC: 31 MG/DL (ref 8–23)
BUN/CREAT BLD: 23 (ref 9–20)
BUN/CREAT BLD: 28 (ref 9–20)
BUN/CREAT BLD: 32 (ref 9–20)
BUN/CREAT BLD: 35 (ref 9–20)
BUN/CREAT BLD: 36 (ref 9–20)
BUN/CREAT BLD: 38 (ref 9–20)
CALCIUM SERPL-MCNC: 8 MG/DL (ref 8.6–10.4)
CALCIUM SERPL-MCNC: 8.6 MG/DL (ref 8.6–10.4)
CALCIUM SERPL-MCNC: 8.7 MG/DL (ref 8.6–10.4)
CALCIUM SERPL-MCNC: 8.9 MG/DL (ref 8.6–10.4)
CALCIUM SERPL-MCNC: 9.1 MG/DL (ref 8.6–10.4)
CALCIUM SERPL-MCNC: 9.6 MG/DL (ref 8.6–10.4)
CASTS UA: ABNORMAL /LPF (ref 0–2)
CHLORIDE BLD-SCNC: 102 MMOL/L (ref 98–107)
CHLORIDE BLD-SCNC: 102 MMOL/L (ref 98–107)
CHLORIDE BLD-SCNC: 103 MMOL/L (ref 98–107)
CHLORIDE BLD-SCNC: 94 MMOL/L (ref 98–107)
CHLORIDE BLD-SCNC: 97 MMOL/L (ref 98–107)
CHLORIDE BLD-SCNC: 99 MMOL/L (ref 98–107)
CO2: 20 MMOL/L (ref 20–31)
CO2: 21 MMOL/L (ref 20–31)
CO2: 21 MMOL/L (ref 20–31)
CO2: 23 MMOL/L (ref 20–31)
CO2: 23 MMOL/L (ref 20–31)
CO2: 24 MMOL/L (ref 20–31)
COLOR: ABNORMAL
COMMENT UA: ABNORMAL
CREAT SERPL-MCNC: 0.72 MG/DL (ref 0.5–0.9)
CREAT SERPL-MCNC: 0.8 MG/DL (ref 0.5–0.9)
CREAT SERPL-MCNC: 0.81 MG/DL (ref 0.5–0.9)
CREAT SERPL-MCNC: 0.85 MG/DL (ref 0.5–0.9)
CREAT SERPL-MCNC: 0.85 MG/DL (ref 0.5–0.9)
CREAT SERPL-MCNC: 0.96 MG/DL (ref 0.5–0.9)
CRYSTALS, UA: ABNORMAL /HPF
CULTURE: ABNORMAL
DIFFERENTIAL TYPE: ABNORMAL
EKG ATRIAL RATE: 170 BPM
EKG ATRIAL RATE: 98 BPM
EKG ATRIAL RATE: 99 BPM
EKG P AXIS: 59 DEGREES
EKG P AXIS: 59 DEGREES
EKG P-R INTERVAL: 188 MS
EKG P-R INTERVAL: 190 MS
EKG Q-T INTERVAL: 350 MS
EKG Q-T INTERVAL: 354 MS
EKG Q-T INTERVAL: 384 MS
EKG QRS DURATION: 120 MS
EKG QRS DURATION: 132 MS
EKG QRS DURATION: 136 MS
EKG QTC CALCULATION (BAZETT): 449 MS
EKG QTC CALCULATION (BAZETT): 451 MS
EKG QTC CALCULATION (BAZETT): 480 MS
EKG R AXIS: 64 DEGREES
EKG R AXIS: 65 DEGREES
EKG R AXIS: 97 DEGREES
EKG T AXIS: -15 DEGREES
EKG T AXIS: 138 DEGREES
EKG T AXIS: 144 DEGREES
EKG VENTRICULAR RATE: 94 BPM
EKG VENTRICULAR RATE: 98 BPM
EKG VENTRICULAR RATE: 99 BPM
EOSINOPHILS RELATIVE PERCENT: 0 % (ref 1–4)
EOSINOPHILS RELATIVE PERCENT: 1 % (ref 1–4)
EOSINOPHILS RELATIVE PERCENT: 4 % (ref 1–4)
EOSINOPHILS RELATIVE PERCENT: 4 % (ref 1–4)
EPITHELIAL CELLS UA: ABNORMAL /HPF (ref 0–5)
GFR AFRICAN AMERICAN: >60 ML/MIN
GFR NON-AFRICAN AMERICAN: 54 ML/MIN
GFR NON-AFRICAN AMERICAN: >60 ML/MIN
GFR SERPL CREATININE-BSD FRML MDRD: ABNORMAL ML/MIN/{1.73_M2}
GLUCOSE BLD-MCNC: 107 MG/DL (ref 70–99)
GLUCOSE BLD-MCNC: 119 MG/DL (ref 70–99)
GLUCOSE BLD-MCNC: 124 MG/DL (ref 70–99)
GLUCOSE BLD-MCNC: 127 MG/DL (ref 70–99)
GLUCOSE BLD-MCNC: 90 MG/DL (ref 70–99)
GLUCOSE BLD-MCNC: 94 MG/DL (ref 70–99)
GLUCOSE URINE: NEGATIVE
HCT VFR BLD CALC: 25.4 % (ref 36.3–47.1)
HCT VFR BLD CALC: 25.5 % (ref 36.3–47.1)
HCT VFR BLD CALC: 26.2 % (ref 36.3–47.1)
HCT VFR BLD CALC: 27.7 % (ref 36.3–47.1)
HCT VFR BLD CALC: 27.8 % (ref 36.3–47.1)
HCT VFR BLD CALC: 28.5 % (ref 36.3–47.1)
HCT VFR BLD CALC: 28.5 % (ref 36.3–47.1)
HCT VFR BLD CALC: 29 % (ref 36.3–47.1)
HCT VFR BLD CALC: 33 % (ref 36.3–47.1)
HEMOGLOBIN: 7.4 G/DL (ref 11.9–15.1)
HEMOGLOBIN: 7.5 G/DL (ref 11.9–15.1)
HEMOGLOBIN: 7.7 G/DL (ref 11.9–15.1)
HEMOGLOBIN: 7.8 G/DL (ref 11.9–15.1)
HEMOGLOBIN: 8.1 G/DL (ref 11.9–15.1)
HEMOGLOBIN: 8.3 G/DL (ref 11.9–15.1)
HEMOGLOBIN: 9.3 G/DL (ref 11.9–15.1)
IMMATURE GRANULOCYTES: 0 %
IMMATURE GRANULOCYTES: 0 %
IMMATURE GRANULOCYTES: 1 %
IMMATURE GRANULOCYTES: 2 %
INR BLD: 1.6
KETONES, URINE: NEGATIVE
LACTIC ACID, SEPSIS WHOLE BLOOD: ABNORMAL MMOL/L (ref 0.5–1.9)
LACTIC ACID, SEPSIS WHOLE BLOOD: ABNORMAL MMOL/L (ref 0.5–1.9)
LACTIC ACID, SEPSIS: 3.8 MMOL/L (ref 0.5–1.9)
LACTIC ACID, SEPSIS: 3.8 MMOL/L (ref 0.5–1.9)
LACTIC ACID: 4.2 MMOL/L (ref 0.5–2.2)
LEUKOCYTE ESTERASE, URINE: ABNORMAL
LV EF: 55 %
LV EF: 60 %
LVEF MODALITY: NORMAL
LVEF MODALITY: NORMAL
LYMPHOCYTES # BLD: 14 % (ref 24–43)
LYMPHOCYTES # BLD: 16 % (ref 24–43)
LYMPHOCYTES # BLD: 18 % (ref 24–43)
LYMPHOCYTES # BLD: 19 % (ref 24–43)
LYMPHOCYTES # BLD: 33 % (ref 24–43)
LYMPHOCYTES # BLD: 38 % (ref 24–43)
Lab: ABNORMAL
MCH RBC QN AUTO: 24.9 PG (ref 25.2–33.5)
MCH RBC QN AUTO: 24.9 PG (ref 25.2–33.5)
MCH RBC QN AUTO: 25 PG (ref 25.2–33.5)
MCH RBC QN AUTO: 25.2 PG (ref 25.2–33.5)
MCH RBC QN AUTO: 25.5 PG (ref 25.2–33.5)
MCH RBC QN AUTO: 25.5 PG (ref 25.2–33.5)
MCH RBC QN AUTO: 25.7 PG (ref 25.2–33.5)
MCH RBC QN AUTO: 25.8 PG (ref 25.2–33.5)
MCHC RBC AUTO-ENTMCNC: 28.1 G/DL (ref 25.2–33.5)
MCHC RBC AUTO-ENTMCNC: 28.2 G/DL (ref 25.2–33.5)
MCHC RBC AUTO-ENTMCNC: 28.4 G/DL (ref 25.2–33.5)
MCHC RBC AUTO-ENTMCNC: 28.4 G/DL (ref 25.2–33.5)
MCHC RBC AUTO-ENTMCNC: 28.6 G/DL (ref 25.2–33.5)
MCHC RBC AUTO-ENTMCNC: 29 G/DL (ref 25.2–33.5)
MCHC RBC AUTO-ENTMCNC: 29.2 G/DL (ref 25.2–33.5)
MCHC RBC AUTO-ENTMCNC: 29.4 G/DL (ref 25.2–33.5)
MCV RBC AUTO: 87.1 FL (ref 82.6–102.9)
MCV RBC AUTO: 87.3 FL (ref 82.6–102.9)
MCV RBC AUTO: 87.7 FL (ref 82.6–102.9)
MCV RBC AUTO: 87.7 FL (ref 82.6–102.9)
MCV RBC AUTO: 87.9 FL (ref 82.6–102.9)
MCV RBC AUTO: 88.5 FL (ref 82.6–102.9)
MCV RBC AUTO: 89.7 FL (ref 82.6–102.9)
MCV RBC AUTO: 90.7 FL (ref 82.6–102.9)
MONOCYTES # BLD: 11 % (ref 3–12)
MONOCYTES # BLD: 15 % (ref 3–12)
MONOCYTES # BLD: 3 % (ref 3–12)
MONOCYTES # BLD: 3 % (ref 3–12)
MONOCYTES # BLD: 4 % (ref 3–12)
MONOCYTES # BLD: 5 % (ref 3–12)
MORPHOLOGY: ABNORMAL
MUCUS: ABNORMAL
MYOGLOBIN: 182 NG/ML (ref 25–58)
MYOGLOBIN: 197 NG/ML (ref 25–58)
MYOGLOBIN: 256 NG/ML (ref 25–58)
MYOGLOBIN: 312 NG/ML (ref 25–58)
NITRITE, URINE: NEGATIVE
NRBC AUTOMATED: 0 PER 100 WBC
NRBC AUTOMATED: 0.4 PER 100 WBC
NRBC AUTOMATED: 0.9 PER 100 WBC
NRBC AUTOMATED: 0.9 PER 100 WBC
NRBC AUTOMATED: ABNORMAL PER 100 WBC
OTHER OBSERVATIONS UA: ABNORMAL
PDW BLD-RTO: 27.1 % (ref 11.8–14.4)
PDW BLD-RTO: 27.6 % (ref 11.8–14.4)
PDW BLD-RTO: 27.8 % (ref 11.8–14.4)
PDW BLD-RTO: 27.9 % (ref 11.8–14.4)
PDW BLD-RTO: 27.9 % (ref 11.8–14.4)
PDW BLD-RTO: 28.1 % (ref 11.8–14.4)
PDW BLD-RTO: 28.1 % (ref 11.8–14.4)
PDW BLD-RTO: 29.6 % (ref 11.8–14.4)
PH UA: 5.5 (ref 5–6)
PLATELET # BLD: 306 K/UL (ref 138–453)
PLATELET # BLD: 360 K/UL (ref 138–453)
PLATELET # BLD: 392 K/UL (ref 138–453)
PLATELET # BLD: 774 K/UL (ref 138–453)
PLATELET # BLD: ABNORMAL K/UL (ref 138–453)
PLATELET ESTIMATE: ABNORMAL
PLATELET, FLUORESCENCE: 1824 K/UL (ref 138–453)
PLATELET, FLUORESCENCE: 1824 K/UL (ref 138–453)
PLATELET, FLUORESCENCE: 304 K/UL (ref 138–453)
PLATELET, FLUORESCENCE: 315 K/UL (ref 138–453)
PLATELET, IMMATURE FRACTION: 10.9 % (ref 1.1–10.3)
PLATELET, IMMATURE FRACTION: 8.2 % (ref 1.1–10.3)
PLATELET, IMMATURE FRACTION: 8.2 % (ref 1.1–10.3)
PLATELET, IMMATURE FRACTION: 9.7 % (ref 1.1–10.3)
PMV BLD AUTO: 10 FL (ref 8.1–13.5)
PMV BLD AUTO: 10.5 FL (ref 8.1–13.5)
PMV BLD AUTO: 11.2 FL (ref 8.1–13.5)
PMV BLD AUTO: 9.7 FL (ref 8.1–13.5)
PMV BLD AUTO: ABNORMAL FL (ref 8.1–13.5)
POTASSIUM SERPL-SCNC: 3.4 MMOL/L (ref 3.7–5.3)
POTASSIUM SERPL-SCNC: 3.5 MMOL/L (ref 3.7–5.3)
POTASSIUM SERPL-SCNC: 3.7 MMOL/L (ref 3.7–5.3)
POTASSIUM SERPL-SCNC: 3.9 MMOL/L (ref 3.7–5.3)
POTASSIUM SERPL-SCNC: 3.9 MMOL/L (ref 3.7–5.3)
POTASSIUM SERPL-SCNC: 4 MMOL/L (ref 3.7–5.3)
POTASSIUM SERPL-SCNC: 4 MMOL/L (ref 3.7–5.3)
PRO-BNP: 2751 PG/ML
PROTEIN UA: ABNORMAL
PROTHROMBIN TIME: 18.8 SEC (ref 11.5–14.2)
RBC # BLD: 2.88 M/UL (ref 3.95–5.11)
RBC # BLD: 2.98 M/UL (ref 3.95–5.11)
RBC # BLD: 3.1 M/UL (ref 3.95–5.11)
RBC # BLD: 3.18 M/UL (ref 3.95–5.11)
RBC # BLD: 3.25 M/UL (ref 3.95–5.11)
RBC # BLD: 3.25 M/UL (ref 3.95–5.11)
RBC # BLD: 3.32 M/UL (ref 3.95–5.11)
RBC # BLD: 3.64 M/UL (ref 3.95–5.11)
RBC # BLD: ABNORMAL 10*6/UL
RBC UA: ABNORMAL /HPF (ref 0–4)
REASON FOR REJECTION: NORMAL
RENAL EPITHELIAL, UA: ABNORMAL /HPF
SARS-COV-2, RAPID: NOT DETECTED
SARS-COV-2: NORMAL
SARS-COV-2: NORMAL
SEG NEUTROPHILS: 45 % (ref 36–65)
SEG NEUTROPHILS: 45 % (ref 36–65)
SEG NEUTROPHILS: 76 % (ref 36–65)
SEG NEUTROPHILS: 77 % (ref 36–65)
SEG NEUTROPHILS: 78 % (ref 36–65)
SEG NEUTROPHILS: 80 % (ref 36–65)
SEGMENTED NEUTROPHILS ABSOLUTE COUNT: 3.54 K/UL (ref 1.5–8.1)
SEGMENTED NEUTROPHILS ABSOLUTE COUNT: 4.7 K/UL (ref 1.5–8.1)
SEGMENTED NEUTROPHILS ABSOLUTE COUNT: 5 K/UL (ref 1.5–8.1)
SEGMENTED NEUTROPHILS ABSOLUTE COUNT: 6.08 K/UL (ref 1.5–8.1)
SEGMENTED NEUTROPHILS ABSOLUTE COUNT: 6.72 K/UL (ref 1.5–8.1)
SEGMENTED NEUTROPHILS ABSOLUTE COUNT: 7.1 K/UL (ref 1.5–8.1)
SODIUM BLD-SCNC: 129 MMOL/L (ref 135–144)
SODIUM BLD-SCNC: 131 MMOL/L (ref 135–144)
SODIUM BLD-SCNC: 133 MMOL/L (ref 135–144)
SODIUM BLD-SCNC: 134 MMOL/L (ref 135–144)
SODIUM BLD-SCNC: 135 MMOL/L (ref 135–144)
SODIUM BLD-SCNC: 135 MMOL/L (ref 135–144)
SOURCE: NORMAL
SPECIFIC GRAVITY UA: 1.02 (ref 1.01–1.02)
SPECIMEN DESCRIPTION: ABNORMAL
TOTAL PROTEIN: 6.7 G/DL (ref 6.4–8.3)
TRICHOMONAS: ABNORMAL
TROPONIN INTERP: ABNORMAL
TROPONIN T: ABNORMAL NG/ML
TROPONIN, HIGH SENSITIVITY: 57 NG/L (ref 0–14)
TROPONIN, HIGH SENSITIVITY: 60 NG/L (ref 0–14)
TROPONIN, HIGH SENSITIVITY: 61 NG/L (ref 0–14)
TROPONIN, HIGH SENSITIVITY: 62 NG/L (ref 0–14)
TURBIDITY: ABNORMAL
URINE HGB: ABNORMAL
UROBILINOGEN, URINE: NORMAL
WBC # BLD: 11.1 K/UL (ref 3.5–11.3)
WBC # BLD: 11.1 K/UL (ref 3.5–11.3)
WBC # BLD: 6.1 K/UL (ref 3.5–11.3)
WBC # BLD: 7.8 K/UL (ref 3.5–11.3)
WBC # BLD: 7.9 K/UL (ref 3.5–11.3)
WBC # BLD: 8 K/UL (ref 3.5–11.3)
WBC # BLD: 8.4 K/UL (ref 3.5–11.3)
WBC # BLD: 9.1 K/UL (ref 3.5–11.3)
WBC # BLD: ABNORMAL 10*3/UL
WBC UA: ABNORMAL /HPF (ref 0–4)
YEAST: ABNORMAL
ZZ NTE CLEAN UP: ORDERED TEST: NORMAL
ZZ NTE WITH NAME CLEAN UP: SPECIMEN SOURCE: NORMAL

## 2021-01-01 PROCEDURE — 84484 ASSAY OF TROPONIN QUANT: CPT

## 2021-01-01 PROCEDURE — 2580000003 HC RX 258: Performed by: EMERGENCY MEDICINE

## 2021-01-01 PROCEDURE — 2700000000 HC OXYGEN THERAPY PER DAY

## 2021-01-01 PROCEDURE — 6370000000 HC RX 637 (ALT 250 FOR IP): Performed by: NURSE PRACTITIONER

## 2021-01-01 PROCEDURE — 81001 URINALYSIS AUTO W/SCOPE: CPT

## 2021-01-01 PROCEDURE — 94760 N-INVAS EAR/PLS OXIMETRY 1: CPT

## 2021-01-01 PROCEDURE — 72125 CT NECK SPINE W/O DYE: CPT

## 2021-01-01 PROCEDURE — 6360000002 HC RX W HCPCS

## 2021-01-01 PROCEDURE — 6360000002 HC RX W HCPCS: Performed by: INTERNAL MEDICINE

## 2021-01-01 PROCEDURE — 1123F ACP DISCUSS/DSCN MKR DOCD: CPT | Performed by: INTERNAL MEDICINE

## 2021-01-01 PROCEDURE — 2580000003 HC RX 258: Performed by: INTERNAL MEDICINE

## 2021-01-01 PROCEDURE — 6370000000 HC RX 637 (ALT 250 FOR IP): Performed by: INTERNAL MEDICINE

## 2021-01-01 PROCEDURE — 94761 N-INVAS EAR/PLS OXIMETRY MLT: CPT

## 2021-01-01 PROCEDURE — 2060000000 HC ICU INTERMEDIATE R&B

## 2021-01-01 PROCEDURE — 85055 RETICULATED PLATELET ASSAY: CPT

## 2021-01-01 PROCEDURE — 99222 1ST HOSP IP/OBS MODERATE 55: CPT | Performed by: INTERNAL MEDICINE

## 2021-01-01 PROCEDURE — 4040F PNEUMOC VAC/ADMIN/RCVD: CPT | Performed by: INTERNAL MEDICINE

## 2021-01-01 PROCEDURE — 1036F TOBACCO NON-USER: CPT | Performed by: INTERNAL MEDICINE

## 2021-01-01 PROCEDURE — 36415 COLL VENOUS BLD VENIPUNCTURE: CPT

## 2021-01-01 PROCEDURE — 93005 ELECTROCARDIOGRAM TRACING: CPT | Performed by: INTERNAL MEDICINE

## 2021-01-01 PROCEDURE — 93010 ELECTROCARDIOGRAM REPORT: CPT | Performed by: INTERNAL MEDICINE

## 2021-01-01 PROCEDURE — 87150 DNA/RNA AMPLIFIED PROBE: CPT

## 2021-01-01 PROCEDURE — 83874 ASSAY OF MYOGLOBIN: CPT

## 2021-01-01 PROCEDURE — 85610 PROTHROMBIN TIME: CPT

## 2021-01-01 PROCEDURE — U0002 COVID-19 LAB TEST NON-CDC: HCPCS

## 2021-01-01 PROCEDURE — 85025 COMPLETE CBC W/AUTO DIFF WBC: CPT

## 2021-01-01 PROCEDURE — 96372 THER/PROPH/DIAG INJ SC/IM: CPT

## 2021-01-01 PROCEDURE — 71046 X-RAY EXAM CHEST 2 VIEWS: CPT

## 2021-01-01 PROCEDURE — 85027 COMPLETE CBC AUTOMATED: CPT

## 2021-01-01 PROCEDURE — 84132 ASSAY OF SERUM POTASSIUM: CPT

## 2021-01-01 PROCEDURE — 87205 SMEAR GRAM STAIN: CPT

## 2021-01-01 PROCEDURE — 93880 EXTRACRANIAL BILAT STUDY: CPT

## 2021-01-01 PROCEDURE — 97165 OT EVAL LOW COMPLEX 30 MIN: CPT | Performed by: OCCUPATIONAL THERAPIST

## 2021-01-01 PROCEDURE — 71045 X-RAY EXAM CHEST 1 VIEW: CPT

## 2021-01-01 PROCEDURE — G8427 DOCREV CUR MEDS BY ELIG CLIN: HCPCS | Performed by: INTERNAL MEDICINE

## 2021-01-01 PROCEDURE — 97110 THERAPEUTIC EXERCISES: CPT

## 2021-01-01 PROCEDURE — 2500000003 HC RX 250 WO HCPCS: Performed by: INTERNAL MEDICINE

## 2021-01-01 PROCEDURE — 97116 GAIT TRAINING THERAPY: CPT

## 2021-01-01 PROCEDURE — G0378 HOSPITAL OBSERVATION PER HR: HCPCS

## 2021-01-01 PROCEDURE — 87077 CULTURE AEROBIC IDENTIFY: CPT

## 2021-01-01 PROCEDURE — 6370000000 HC RX 637 (ALT 250 FOR IP): Performed by: FAMILY MEDICINE

## 2021-01-01 PROCEDURE — 99231 SBSQ HOSP IP/OBS SF/LOW 25: CPT | Performed by: INTERNAL MEDICINE

## 2021-01-01 PROCEDURE — 1090F PRES/ABSN URINE INCON ASSESS: CPT | Performed by: INTERNAL MEDICINE

## 2021-01-01 PROCEDURE — 94640 AIRWAY INHALATION TREATMENT: CPT

## 2021-01-01 PROCEDURE — 96365 THER/PROPH/DIAG IV INF INIT: CPT

## 2021-01-01 PROCEDURE — G8420 CALC BMI NORM PARAMETERS: HCPCS | Performed by: INTERNAL MEDICINE

## 2021-01-01 PROCEDURE — 96376 TX/PRO/DX INJ SAME DRUG ADON: CPT

## 2021-01-01 PROCEDURE — 85018 HEMOGLOBIN: CPT

## 2021-01-01 PROCEDURE — 99239 HOSP IP/OBS DSCHRG MGMT >30: CPT | Performed by: FAMILY MEDICINE

## 2021-01-01 PROCEDURE — 96366 THER/PROPH/DIAG IV INF ADDON: CPT

## 2021-01-01 PROCEDURE — 94150 VITAL CAPACITY TEST: CPT

## 2021-01-01 PROCEDURE — 93306 TTE W/DOPPLER COMPLETE: CPT

## 2021-01-01 PROCEDURE — 97530 THERAPEUTIC ACTIVITIES: CPT | Performed by: PHYSICAL THERAPY ASSISTANT

## 2021-01-01 PROCEDURE — 96361 HYDRATE IV INFUSION ADD-ON: CPT

## 2021-01-01 PROCEDURE — 80048 BASIC METABOLIC PNL TOTAL CA: CPT

## 2021-01-01 PROCEDURE — 99214 OFFICE O/P EST MOD 30 MIN: CPT | Performed by: INTERNAL MEDICINE

## 2021-01-01 PROCEDURE — 85014 HEMATOCRIT: CPT

## 2021-01-01 PROCEDURE — 94664 DEMO&/EVAL PT USE INHALER: CPT

## 2021-01-01 PROCEDURE — 97535 SELF CARE MNGMENT TRAINING: CPT

## 2021-01-01 PROCEDURE — 6370000000 HC RX 637 (ALT 250 FOR IP)

## 2021-01-01 PROCEDURE — 99285 EMERGENCY DEPT VISIT HI MDM: CPT

## 2021-01-01 PROCEDURE — G8417 CALC BMI ABV UP PARAM F/U: HCPCS | Performed by: INTERNAL MEDICINE

## 2021-01-01 PROCEDURE — 96375 TX/PRO/DX INJ NEW DRUG ADDON: CPT

## 2021-01-01 PROCEDURE — 99204 OFFICE O/P NEW MOD 45 MIN: CPT | Performed by: INTERNAL MEDICINE

## 2021-01-01 PROCEDURE — 87186 SC STD MICRODIL/AGAR DIL: CPT

## 2021-01-01 PROCEDURE — 87086 URINE CULTURE/COLONY COUNT: CPT

## 2021-01-01 PROCEDURE — 97161 PT EVAL LOW COMPLEX 20 MIN: CPT

## 2021-01-01 PROCEDURE — 6360000002 HC RX W HCPCS: Performed by: EMERGENCY MEDICINE

## 2021-01-01 PROCEDURE — 96367 TX/PROPH/DG ADDL SEQ IV INF: CPT

## 2021-01-01 PROCEDURE — G8484 FLU IMMUNIZE NO ADMIN: HCPCS | Performed by: INTERNAL MEDICINE

## 2021-01-01 PROCEDURE — 2580000003 HC RX 258: Performed by: NURSE PRACTITIONER

## 2021-01-01 PROCEDURE — 80053 COMPREHEN METABOLIC PANEL: CPT

## 2021-01-01 PROCEDURE — 87088 URINE BACTERIA CULTURE: CPT

## 2021-01-01 PROCEDURE — 70450 CT HEAD/BRAIN W/O DYE: CPT

## 2021-01-01 PROCEDURE — 83605 ASSAY OF LACTIC ACID: CPT

## 2021-01-01 PROCEDURE — 93005 ELECTROCARDIOGRAM TRACING: CPT | Performed by: EMERGENCY MEDICINE

## 2021-01-01 PROCEDURE — 87040 BLOOD CULTURE FOR BACTERIA: CPT

## 2021-01-01 PROCEDURE — 99214 OFFICE O/P EST MOD 30 MIN: CPT

## 2021-01-01 PROCEDURE — 83880 ASSAY OF NATRIURETIC PEPTIDE: CPT

## 2021-01-01 PROCEDURE — 99219 PR INITIAL OBSERVATION CARE/DAY 50 MINUTES: CPT | Performed by: INTERNAL MEDICINE

## 2021-01-01 RX ORDER — CIPROFLOXACIN 250 MG/1
250 TABLET, FILM COATED ORAL
Status: DISCONTINUED | OUTPATIENT
Start: 2021-01-01 | End: 2021-01-01 | Stop reason: ALTCHOICE

## 2021-01-01 RX ORDER — ATORVASTATIN CALCIUM 10 MG/1
1 TABLET, FILM COATED ORAL DAILY
Status: DISCONTINUED | OUTPATIENT
Start: 2021-01-01 | End: 2021-01-01

## 2021-01-01 RX ORDER — LATANOPROST 50 UG/ML
1 SOLUTION/ DROPS OPHTHALMIC NIGHTLY
Status: DISCONTINUED | OUTPATIENT
Start: 2021-01-01 | End: 2021-01-01 | Stop reason: HOSPADM

## 2021-01-01 RX ORDER — ONDANSETRON 2 MG/ML
4 INJECTION INTRAMUSCULAR; INTRAVENOUS EVERY 4 HOURS PRN
Status: DISCONTINUED | OUTPATIENT
Start: 2021-01-01 | End: 2021-01-01 | Stop reason: HOSPADM

## 2021-01-01 RX ORDER — LOPERAMIDE HYDROCHLORIDE 2 MG/1
2 CAPSULE ORAL 3 TIMES DAILY PRN
Qty: 30 CAPSULE | Refills: 0
Start: 2021-01-01 | End: 2021-02-09

## 2021-01-01 RX ORDER — FUROSEMIDE 40 MG/1
40 TABLET ORAL DAILY
Qty: 30 TABLET | Status: ON HOLD | COMMUNITY
Start: 2021-01-01 | End: 2021-01-01 | Stop reason: SDUPTHER

## 2021-01-01 RX ORDER — FUROSEMIDE 10 MG/ML
20 INJECTION INTRAMUSCULAR; INTRAVENOUS ONCE
Status: COMPLETED | OUTPATIENT
Start: 2021-01-01 | End: 2021-01-01

## 2021-01-01 RX ORDER — FUROSEMIDE 40 MG/1
40 TABLET ORAL DAILY
Status: DISCONTINUED | OUTPATIENT
Start: 2021-01-01 | End: 2021-01-01

## 2021-01-01 RX ORDER — FUROSEMIDE 20 MG/1
20 TABLET ORAL DAILY
Status: DISCONTINUED | OUTPATIENT
Start: 2021-01-01 | End: 2021-01-01 | Stop reason: HOSPADM

## 2021-01-01 RX ORDER — POTASSIUM CHLORIDE 20 MEQ/1
40 TABLET, EXTENDED RELEASE ORAL ONCE
Status: COMPLETED | OUTPATIENT
Start: 2021-01-01 | End: 2021-01-01

## 2021-01-01 RX ORDER — LISINOPRIL 5 MG/1
5 TABLET ORAL DAILY
Status: DISCONTINUED | OUTPATIENT
Start: 2021-01-01 | End: 2021-01-01

## 2021-01-01 RX ORDER — VITAMIN B COMPLEX
2000 TABLET ORAL DAILY
Status: DISCONTINUED | OUTPATIENT
Start: 2021-01-01 | End: 2021-01-01 | Stop reason: HOSPADM

## 2021-01-01 RX ORDER — 0.9 % SODIUM CHLORIDE 0.9 %
1000 INTRAVENOUS SOLUTION INTRAVENOUS ONCE
Status: COMPLETED | OUTPATIENT
Start: 2021-01-01 | End: 2021-01-01

## 2021-01-01 RX ORDER — LISINOPRIL 5 MG/1
5 TABLET ORAL DAILY
Qty: 90 TABLET | Refills: 1 | Status: ON HOLD | OUTPATIENT
Start: 2021-01-01 | End: 2021-01-01 | Stop reason: SDUPTHER

## 2021-01-01 RX ORDER — CALCIUM CARBONATE 500(1250)
1500 TABLET ORAL DAILY
Status: DISCONTINUED | OUTPATIENT
Start: 2021-01-01 | End: 2021-01-01 | Stop reason: HOSPADM

## 2021-01-01 RX ORDER — LANOLIN ALCOHOL/MO/W.PET/CERES
3 CREAM (GRAM) TOPICAL NIGHTLY PRN
Qty: 30 MG | Refills: 0
Start: 2021-01-01

## 2021-01-01 RX ORDER — VIT A/VIT C/VIT E/ZINC/COPPER 4296-226
1 CAPSULE ORAL 2 TIMES DAILY
Status: DISCONTINUED | OUTPATIENT
Start: 2021-01-01 | End: 2021-01-01 | Stop reason: HOSPADM

## 2021-01-01 RX ORDER — ASPIRIN 81 MG/1
81 TABLET ORAL DAILY
Status: DISCONTINUED | OUTPATIENT
Start: 2021-01-01 | End: 2021-01-01 | Stop reason: HOSPADM

## 2021-01-01 RX ORDER — LISINOPRIL 2.5 MG/1
2.5 TABLET ORAL DAILY
Status: DISCONTINUED | OUTPATIENT
Start: 2021-01-01 | End: 2021-01-01 | Stop reason: HOSPADM

## 2021-01-01 RX ORDER — LISINOPRIL 2.5 MG/1
2.5 TABLET ORAL DAILY
Qty: 30 TABLET | Refills: 0
Start: 2021-01-01

## 2021-01-01 RX ORDER — ALBUTEROL SULFATE 2.5 MG/3ML
2.5 SOLUTION RESPIRATORY (INHALATION) EVERY 6 HOURS PRN
Qty: 120 EACH | Refills: 0
Start: 2021-01-01

## 2021-01-01 RX ORDER — TIMOLOL MALEATE 5 MG/ML
1 SOLUTION/ DROPS OPHTHALMIC 2 TIMES DAILY
Status: DISCONTINUED | OUTPATIENT
Start: 2021-01-01 | End: 2021-01-01 | Stop reason: HOSPADM

## 2021-01-01 RX ORDER — DOXYCYCLINE 100 MG/1
100 CAPSULE ORAL EVERY 12 HOURS SCHEDULED
Status: DISCONTINUED | OUTPATIENT
Start: 2021-01-01 | End: 2021-01-01 | Stop reason: ALTCHOICE

## 2021-01-01 RX ORDER — TIMOLOL MALEATE 5 MG/ML
1 SOLUTION/ DROPS OPHTHALMIC EVERY MORNING
Status: DISCONTINUED | OUTPATIENT
Start: 2021-01-01 | End: 2021-01-01

## 2021-01-01 RX ORDER — ACETAMINOPHEN 325 MG/1
TABLET ORAL
Status: COMPLETED
Start: 2021-01-01 | End: 2021-01-01

## 2021-01-01 RX ORDER — LEVOFLOXACIN 250 MG/1
250 TABLET ORAL DAILY
Qty: 7 TABLET | Refills: 0
Start: 2021-01-01 | End: 2021-02-07

## 2021-01-01 RX ORDER — SODIUM CHLORIDE 0.9 % (FLUSH) 0.9 %
10 SYRINGE (ML) INJECTION EVERY 12 HOURS SCHEDULED
Status: DISCONTINUED | OUTPATIENT
Start: 2021-01-01 | End: 2021-01-01 | Stop reason: HOSPADM

## 2021-01-01 RX ORDER — ONDANSETRON 2 MG/ML
INJECTION INTRAMUSCULAR; INTRAVENOUS
Status: DISPENSED
Start: 2021-01-01 | End: 2021-01-01

## 2021-01-01 RX ORDER — SODIUM CHLORIDE FOR INHALATION 0.9 %
3 VIAL, NEBULIZER (ML) INHALATION
Status: DISCONTINUED | OUTPATIENT
Start: 2021-01-01 | End: 2021-01-01 | Stop reason: HOSPADM

## 2021-01-01 RX ORDER — ALBUTEROL SULFATE 2.5 MG/3ML
2.5 SOLUTION RESPIRATORY (INHALATION)
Status: DISCONTINUED | OUTPATIENT
Start: 2021-01-01 | End: 2021-01-01

## 2021-01-01 RX ORDER — SODIUM CHLORIDE 1000 MG
1 TABLET, SOLUBLE MISCELLANEOUS DAILY
Qty: 90 TABLET | Refills: 3
Start: 2021-01-01

## 2021-01-01 RX ORDER — FUROSEMIDE 10 MG/ML
20 INJECTION INTRAMUSCULAR; INTRAVENOUS ONCE
Status: DISCONTINUED | OUTPATIENT
Start: 2021-01-01 | End: 2021-01-01

## 2021-01-01 RX ORDER — 0.9 % SODIUM CHLORIDE 0.9 %
500 INTRAVENOUS SOLUTION INTRAVENOUS ONCE
Status: COMPLETED | OUTPATIENT
Start: 2021-01-01 | End: 2021-01-01

## 2021-01-01 RX ORDER — ATORVASTATIN CALCIUM 10 MG/1
10 TABLET, FILM COATED ORAL NIGHTLY
Status: DISCONTINUED | OUTPATIENT
Start: 2021-01-01 | End: 2021-01-01 | Stop reason: HOSPADM

## 2021-01-01 RX ORDER — SODIUM CHLORIDE 9 MG/ML
INJECTION, SOLUTION INTRAVENOUS CONTINUOUS
Status: DISCONTINUED | OUTPATIENT
Start: 2021-01-01 | End: 2021-01-01

## 2021-01-01 RX ORDER — FUROSEMIDE 10 MG/ML
20 INJECTION INTRAMUSCULAR; INTRAVENOUS DAILY
Status: DISCONTINUED | OUTPATIENT
Start: 2021-01-01 | End: 2021-01-01

## 2021-01-01 RX ORDER — HYDROXYUREA 500 MG/1
500 CAPSULE ORAL DAILY
Status: DISCONTINUED | OUTPATIENT
Start: 2021-01-01 | End: 2021-01-01

## 2021-01-01 RX ORDER — LANOLIN ALCOHOL/MO/W.PET/CERES
3 CREAM (GRAM) TOPICAL NIGHTLY PRN
Status: DISCONTINUED | OUTPATIENT
Start: 2021-01-01 | End: 2021-01-01 | Stop reason: HOSPADM

## 2021-01-01 RX ORDER — FUROSEMIDE 10 MG/ML
INJECTION INTRAMUSCULAR; INTRAVENOUS
Status: COMPLETED
Start: 2021-01-01 | End: 2021-01-01

## 2021-01-01 RX ORDER — FUROSEMIDE 20 MG/1
20 TABLET ORAL DAILY
Qty: 30 TABLET | Refills: 0
Start: 2021-01-01

## 2021-01-01 RX ORDER — SODIUM CHLORIDE 1000 MG
1 TABLET, SOLUBLE MISCELLANEOUS 2 TIMES DAILY WITH MEALS
Status: DISCONTINUED | OUTPATIENT
Start: 2021-01-01 | End: 2021-01-01 | Stop reason: HOSPADM

## 2021-01-01 RX ORDER — LEVOFLOXACIN 250 MG/1
250 TABLET ORAL DAILY
Status: DISCONTINUED | OUTPATIENT
Start: 2021-01-01 | End: 2021-01-01 | Stop reason: HOSPADM

## 2021-01-01 RX ORDER — ALBUTEROL SULFATE 2.5 MG/3ML
2.5 SOLUTION RESPIRATORY (INHALATION) EVERY 6 HOURS PRN
Status: DISCONTINUED | OUTPATIENT
Start: 2021-01-01 | End: 2021-01-01 | Stop reason: HOSPADM

## 2021-01-01 RX ORDER — FUROSEMIDE 20 MG/1
20 TABLET ORAL DAILY
COMMUNITY
End: 2021-01-01 | Stop reason: DRUGHIGH

## 2021-01-01 RX ORDER — HYDROXYUREA 500 MG/1
500 CAPSULE ORAL NIGHTLY
Status: DISCONTINUED | OUTPATIENT
Start: 2021-01-01 | End: 2021-01-01 | Stop reason: HOSPADM

## 2021-01-01 RX ORDER — ACETAMINOPHEN 325 MG/1
650 TABLET ORAL EVERY 4 HOURS PRN
Status: DISCONTINUED | OUTPATIENT
Start: 2021-01-01 | End: 2021-01-01 | Stop reason: HOSPADM

## 2021-01-01 RX ORDER — LOPERAMIDE HYDROCHLORIDE 2 MG/1
2 CAPSULE ORAL 4 TIMES DAILY PRN
Status: DISCONTINUED | OUTPATIENT
Start: 2021-01-01 | End: 2021-01-01 | Stop reason: HOSPADM

## 2021-01-01 RX ORDER — SODIUM CHLORIDE 0.9 % (FLUSH) 0.9 %
10 SYRINGE (ML) INJECTION PRN
Status: DISCONTINUED | OUTPATIENT
Start: 2021-01-01 | End: 2021-01-01 | Stop reason: HOSPADM

## 2021-01-01 RX ADMIN — FUROSEMIDE 20 MG: 10 INJECTION INTRAMUSCULAR; INTRAVENOUS at 15:11

## 2021-01-01 RX ADMIN — FUROSEMIDE 20 MG: 10 INJECTION, SOLUTION INTRAMUSCULAR; INTRAVENOUS at 08:04

## 2021-01-01 RX ADMIN — Medication 1 CAPSULE: at 11:37

## 2021-01-01 RX ADMIN — SODIUM CHLORIDE, PRESERVATIVE FREE 10 ML: 5 INJECTION INTRAVENOUS at 08:04

## 2021-01-01 RX ADMIN — HYDROXYUREA 500 MG: 500 CAPSULE ORAL at 20:21

## 2021-01-01 RX ADMIN — ACETAMINOPHEN 650 MG: 325 TABLET ORAL at 08:03

## 2021-01-01 RX ADMIN — ATORVASTATIN CALCIUM 10 MG: 10 TABLET, FILM COATED ORAL at 20:12

## 2021-01-01 RX ADMIN — Medication 1 CAPSULE: at 08:10

## 2021-01-01 RX ADMIN — Medication 1 CAPSULE: at 20:41

## 2021-01-01 RX ADMIN — ENOXAPARIN SODIUM 30 MG: 30 INJECTION SUBCUTANEOUS at 08:04

## 2021-01-01 RX ADMIN — MICONAZOLE NITRATE: 20 POWDER TOPICAL at 08:08

## 2021-01-01 RX ADMIN — TIMOLOL MALEATE 1 DROP: 5 SOLUTION/ DROPS OPHTHALMIC at 08:05

## 2021-01-01 RX ADMIN — MICONAZOLE NITRATE: 20 POWDER TOPICAL at 20:41

## 2021-01-01 RX ADMIN — Medication 1500 MG: at 08:03

## 2021-01-01 RX ADMIN — CHOLECALCIFEROL TAB 25 MCG (1000 UNIT) 2000 UNITS: 25 TAB at 15:56

## 2021-01-01 RX ADMIN — FUROSEMIDE 20 MG: 20 TABLET ORAL at 10:06

## 2021-01-01 RX ADMIN — METOPROLOL TARTRATE 25 MG: 25 TABLET, FILM COATED ORAL at 20:12

## 2021-01-01 RX ADMIN — Medication 1500 MG: at 07:56

## 2021-01-01 RX ADMIN — ENOXAPARIN SODIUM 30 MG: 30 INJECTION SUBCUTANEOUS at 15:56

## 2021-01-01 RX ADMIN — ASPIRIN 81 MG: 81 TABLET, COATED ORAL at 08:00

## 2021-01-01 RX ADMIN — LISINOPRIL 2.5 MG: 2.5 TABLET ORAL at 08:03

## 2021-01-01 RX ADMIN — LEVOFLOXACIN 250 MG: 250 TABLET, FILM COATED ORAL at 17:09

## 2021-01-01 RX ADMIN — ATORVASTATIN CALCIUM 10 MG: 10 TABLET, FILM COATED ORAL at 20:21

## 2021-01-01 RX ADMIN — CEFTRIAXONE 1000 MG: 1 INJECTION, POWDER, FOR SOLUTION INTRAMUSCULAR; INTRAVENOUS at 08:45

## 2021-01-01 RX ADMIN — ASPIRIN 81 MG: 81 TABLET, COATED ORAL at 15:55

## 2021-01-01 RX ADMIN — SODIUM CHLORIDE, PRESERVATIVE FREE 10 ML: 5 INJECTION INTRAVENOUS at 20:13

## 2021-01-01 RX ADMIN — LISINOPRIL 2.5 MG: 2.5 TABLET ORAL at 07:55

## 2021-01-01 RX ADMIN — HYDROXYUREA 500 MG: 500 CAPSULE ORAL at 20:41

## 2021-01-01 RX ADMIN — Medication 1 CAPSULE: at 22:04

## 2021-01-01 RX ADMIN — ASPIRIN 81 MG: 81 TABLET, COATED ORAL at 08:46

## 2021-01-01 RX ADMIN — FUROSEMIDE 20 MG: 10 INJECTION, SOLUTION INTRAMUSCULAR; INTRAVENOUS at 15:11

## 2021-01-01 RX ADMIN — SODIUM CHLORIDE TAB 1 GM 1 G: 1 TAB at 11:36

## 2021-01-01 RX ADMIN — ASPIRIN 81 MG: 81 TABLET, COATED ORAL at 08:03

## 2021-01-01 RX ADMIN — CEFTRIAXONE 1000 MG: 1 INJECTION, POWDER, FOR SOLUTION INTRAMUSCULAR; INTRAVENOUS at 08:01

## 2021-01-01 RX ADMIN — LOPERAMIDE HYDROCHLORIDE 2 MG: 2 CAPSULE ORAL at 01:06

## 2021-01-01 RX ADMIN — POTASSIUM CHLORIDE 40 MEQ: 20 TABLET, EXTENDED RELEASE ORAL at 08:45

## 2021-01-01 RX ADMIN — FUROSEMIDE 20 MG: 10 INJECTION, SOLUTION INTRAMUSCULAR; INTRAVENOUS at 08:45

## 2021-01-01 RX ADMIN — TIMOLOL MALEATE 1 DROP: 5 SOLUTION/ DROPS OPHTHALMIC at 16:46

## 2021-01-01 RX ADMIN — ENOXAPARIN SODIUM 30 MG: 30 INJECTION SUBCUTANEOUS at 08:06

## 2021-01-01 RX ADMIN — SODIUM CHLORIDE 1000 ML: 9 INJECTION, SOLUTION INTRAVENOUS at 11:20

## 2021-01-01 RX ADMIN — FUROSEMIDE 20 MG: 10 INJECTION, SOLUTION INTRAMUSCULAR; INTRAVENOUS at 08:01

## 2021-01-01 RX ADMIN — TIMOLOL MALEATE 1 DROP: 5 SOLUTION/ DROPS OPHTHALMIC at 08:46

## 2021-01-01 RX ADMIN — HYDROXYUREA 500 MG: 500 CAPSULE ORAL at 22:04

## 2021-01-01 RX ADMIN — Medication 1500 MG: at 15:56

## 2021-01-01 RX ADMIN — LATANOPROST 1 DROP: 50 SOLUTION OPHTHALMIC at 20:21

## 2021-01-01 RX ADMIN — SODIUM CHLORIDE: 9 INJECTION, SOLUTION INTRAVENOUS at 03:02

## 2021-01-01 RX ADMIN — Medication 1 CAPSULE: at 20:23

## 2021-01-01 RX ADMIN — SODIUM CHLORIDE TAB 1 GM 1 G: 1 TAB at 07:56

## 2021-01-01 RX ADMIN — TIMOLOL MALEATE 1 DROP: 5 SOLUTION/ DROPS OPHTHALMIC at 08:06

## 2021-01-01 RX ADMIN — SODIUM CHLORIDE TAB 1 GM 1 G: 1 TAB at 17:09

## 2021-01-01 RX ADMIN — METOPROLOL TARTRATE 25 MG: 25 TABLET, FILM COATED ORAL at 20:21

## 2021-01-01 RX ADMIN — CALCIUM CHLORIDE 1000 MG: 100 INJECTION INTRAVENOUS; INTRAVENTRICULAR at 08:49

## 2021-01-01 RX ADMIN — ACETAMINOPHEN 650 MG: 325 TABLET ORAL at 15:11

## 2021-01-01 RX ADMIN — CEFTRIAXONE 1000 MG: 1 INJECTION, POWDER, FOR SOLUTION INTRAMUSCULAR; INTRAVENOUS at 13:12

## 2021-01-01 RX ADMIN — CHOLECALCIFEROL TAB 25 MCG (1000 UNIT) 2000 UNITS: 25 TAB at 08:00

## 2021-01-01 RX ADMIN — LATANOPROST 1 DROP: 50 SOLUTION OPHTHALMIC at 20:13

## 2021-01-01 RX ADMIN — SODIUM CHLORIDE, PRESERVATIVE FREE 10 ML: 5 INJECTION INTRAVENOUS at 20:42

## 2021-01-01 RX ADMIN — METOPROLOL TARTRATE 25 MG: 25 TABLET, FILM COATED ORAL at 08:01

## 2021-01-01 RX ADMIN — LISINOPRIL 5 MG: 5 TABLET ORAL at 08:00

## 2021-01-01 RX ADMIN — ALBUTEROL SULFATE 2.5 MG: 2.5 SOLUTION RESPIRATORY (INHALATION) at 08:50

## 2021-01-01 RX ADMIN — HYDROXYUREA 500 MG: 500 CAPSULE ORAL at 20:12

## 2021-01-01 RX ADMIN — ALBUTEROL SULFATE 2.5 MG: 2.5 SOLUTION RESPIRATORY (INHALATION) at 06:22

## 2021-01-01 RX ADMIN — FUROSEMIDE 20 MG: 10 INJECTION, SOLUTION INTRAMUSCULAR; INTRAVENOUS at 11:36

## 2021-01-01 RX ADMIN — VANCOMYCIN HYDROCHLORIDE 1000 MG: 1 INJECTION, POWDER, LYOPHILIZED, FOR SOLUTION INTRAVENOUS at 16:44

## 2021-01-01 RX ADMIN — FUROSEMIDE 20 MG: 10 INJECTION, SOLUTION INTRAMUSCULAR; INTRAVENOUS at 09:21

## 2021-01-01 RX ADMIN — Medication 1500 MG: at 08:00

## 2021-01-01 RX ADMIN — CHOLECALCIFEROL TAB 25 MCG (1000 UNIT) 2000 UNITS: 25 TAB at 07:55

## 2021-01-01 RX ADMIN — SODIUM CHLORIDE 500 ML: 9 INJECTION, SOLUTION INTRAVENOUS at 00:43

## 2021-01-01 RX ADMIN — MICONAZOLE NITRATE: 20 POWDER TOPICAL at 20:12

## 2021-01-01 RX ADMIN — SODIUM CHLORIDE, PRESERVATIVE FREE 10 ML: 5 INJECTION INTRAVENOUS at 16:49

## 2021-01-01 RX ADMIN — SODIUM CHLORIDE, PRESERVATIVE FREE 10 ML: 5 INJECTION INTRAVENOUS at 08:46

## 2021-01-01 RX ADMIN — LISINOPRIL 2.5 MG: 2.5 TABLET ORAL at 08:45

## 2021-01-01 RX ADMIN — SODIUM CHLORIDE, PRESERVATIVE FREE 10 ML: 5 INJECTION INTRAVENOUS at 11:36

## 2021-01-01 RX ADMIN — ENOXAPARIN SODIUM 30 MG: 30 INJECTION SUBCUTANEOUS at 08:46

## 2021-01-01 RX ADMIN — SODIUM CHLORIDE TAB 1 GM 1 G: 1 TAB at 08:04

## 2021-01-01 RX ADMIN — MICONAZOLE NITRATE: 20 POWDER TOPICAL at 11:37

## 2021-01-01 RX ADMIN — ATORVASTATIN CALCIUM 10 MG: 10 TABLET, FILM COATED ORAL at 20:41

## 2021-01-01 RX ADMIN — ALBUTEROL SULFATE 2.5 MG: 2.5 SOLUTION RESPIRATORY (INHALATION) at 09:11

## 2021-01-01 RX ADMIN — SODIUM CHLORIDE TAB 1 GM 1 G: 1 TAB at 16:46

## 2021-01-01 RX ADMIN — METOPROLOL TARTRATE 25 MG: 25 TABLET, FILM COATED ORAL at 08:03

## 2021-01-01 RX ADMIN — LISINOPRIL 5 MG: 5 TABLET ORAL at 15:56

## 2021-01-01 RX ADMIN — Medication 1500 MG: at 08:45

## 2021-01-01 RX ADMIN — FUROSEMIDE 20 MG: 10 INJECTION, SOLUTION INTRAMUSCULAR; INTRAVENOUS at 17:26

## 2021-01-01 RX ADMIN — Medication 1 CAPSULE: at 20:12

## 2021-01-01 RX ADMIN — TIMOLOL MALEATE 1 DROP: 5 SOLUTION/ DROPS OPHTHALMIC at 08:01

## 2021-01-01 RX ADMIN — ONDANSETRON 4 MG: 2 INJECTION INTRAMUSCULAR; INTRAVENOUS at 08:19

## 2021-01-01 RX ADMIN — SODIUM CHLORIDE 1000 ML: 9 INJECTION, SOLUTION INTRAVENOUS at 16:15

## 2021-01-01 RX ADMIN — LATANOPROST 1 DROP: 50 SOLUTION OPHTHALMIC at 22:04

## 2021-01-01 RX ADMIN — ENOXAPARIN SODIUM 30 MG: 30 INJECTION SUBCUTANEOUS at 15:35

## 2021-01-01 RX ADMIN — POTASSIUM CHLORIDE 40 MEQ: 20 TABLET, EXTENDED RELEASE ORAL at 15:55

## 2021-01-01 RX ADMIN — CEFTRIAXONE 1000 MG: 1 INJECTION, POWDER, FOR SOLUTION INTRAMUSCULAR; INTRAVENOUS at 08:04

## 2021-01-01 RX ADMIN — CHOLECALCIFEROL TAB 25 MCG (1000 UNIT) 2000 UNITS: 25 TAB at 08:45

## 2021-01-01 RX ADMIN — CHOLECALCIFEROL TAB 25 MCG (1000 UNIT) 2000 UNITS: 25 TAB at 08:03

## 2021-01-01 RX ADMIN — MICONAZOLE NITRATE: 20 POWDER TOPICAL at 08:05

## 2021-01-01 RX ADMIN — ASPIRIN 81 MG: 81 TABLET, COATED ORAL at 08:06

## 2021-01-01 RX ADMIN — Medication 1 CAPSULE: at 08:05

## 2021-01-01 RX ADMIN — METOPROLOL TARTRATE 25 MG: 25 TABLET, FILM COATED ORAL at 22:04

## 2021-01-01 RX ADMIN — TIMOLOL MALEATE 1 DROP: 5 SOLUTION/ DROPS OPHTHALMIC at 17:09

## 2021-01-01 RX ADMIN — METOPROLOL TARTRATE 25 MG: 25 TABLET, FILM COATED ORAL at 08:45

## 2021-01-01 RX ADMIN — SODIUM CHLORIDE: 9 INJECTION, SOLUTION INTRAVENOUS at 17:27

## 2021-01-01 RX ADMIN — METOPROLOL TARTRATE 25 MG: 25 TABLET, FILM COATED ORAL at 07:56

## 2021-01-01 RX ADMIN — Medication 1 CAPSULE: at 08:03

## 2021-01-01 RX ADMIN — ATORVASTATIN CALCIUM 10 MG: 10 TABLET, FILM COATED ORAL at 22:04

## 2021-01-01 RX ADMIN — LATANOPROST 1 DROP: 50 SOLUTION OPHTHALMIC at 20:41

## 2021-01-01 RX ADMIN — LEVOFLOXACIN 250 MG: 250 TABLET, FILM COATED ORAL at 07:55

## 2021-01-01 RX ADMIN — VANCOMYCIN HYDROCHLORIDE 1000 MG: 1 INJECTION, POWDER, LYOPHILIZED, FOR SOLUTION INTRAVENOUS at 16:46

## 2021-01-01 ASSESSMENT — PAIN SCALES - GENERAL
PAINLEVEL_OUTOF10: 0

## 2021-01-01 ASSESSMENT — ENCOUNTER SYMPTOMS
BACK PAIN: 0
CHEST TIGHTNESS: 0
NAUSEA: 0
EYE ITCHING: 0
NAUSEA: 0
VOMITING: 0
EYE DISCHARGE: 0
ABDOMINAL DISTENTION: 0
VOMITING: 0
SHORTNESS OF BREATH: 1
EYE ITCHING: 0
ABDOMINAL PAIN: 0
ABDOMINAL PAIN: 0
COLOR CHANGE: 0
BACK PAIN: 0
CHEST TIGHTNESS: 0
SHORTNESS OF BREATH: 1
EYE DISCHARGE: 0

## 2021-01-08 NOTE — TELEPHONE ENCOUNTER
Call back and find out the degree of shortness of breath she getting \"slightly\" more short of breath with walking longer distance or if she is extremely short of breath with minimal activity. If she is having any significant worsening shortness of breath or any other symptoms such as chest pain etc. then she should go to urgent care/emergency room over the weekend        I see a CBC has been done but there is no results yet to find out if she is more anemic which could be the problem with the breathing.   Please call lab and ask them to make the CBC stat so we can get it get results before 5:00

## 2021-01-08 NOTE — TELEPHONE ENCOUNTER
Patient was here with her daughter when she told me about the shortness of breath.  She was not struggling to breath just noticed that she felt a little more short of breath than normal and feels that it started after she started the medication    I see she has a critical lab that the lab called and reported

## 2021-01-08 NOTE — PROGRESS NOTES
Today's Date: 1/8/2021  Patient's Name: Maura Mobley  Patient's age: 80 y. o., 1/26/1927    Subjective: The patient is a 80y.o. year old, , female is in the office for SOB. Has been feeling SOB for last one month. No chest pain, no dizziness or syncope and no palpitations. Past Medical History:   has a past medical history of Hyperlipidemia, Hypertension, Impaired fasting glucose, Macular degeneration, Osteopenia, and Urinary urgency. Past Surgical History:   has a past surgical history that includes Cataract removal with implant (Left) and Colonoscopy (11/2006). Home Medications:  Prior to Admission medications    Medication Sig Start Date End Date Taking? Authorizing Provider   furosemide (LASIX) 20 MG tablet Take one tablet daily for three days and report if better 12/4/20  Yes Martinez Mckoy MD   hydroxyurea (HYDREA) 500 MG chemo capsule Take 1 capsule by mouth daily 11/17/20  Yes Martinez Mckoy MD   metoprolol tartrate (LOPRESSOR) 25 MG tablet take 1 tablet by mouth twice a day 11/6/20  Yes Mele Gentile DO   aspirin 81 MG tablet Take 81 mg by mouth daily   Yes Historical Provider, MD   atorvastatin (LIPITOR) 10 MG tablet TAKE ONE TABLET BY MOUTH ONCE DAILY 11/4/19  Yes Martinez Mckoy MD   timolol (TIMOPTIC) 0.5 % ophthalmic solution Place 1 drop into both eyes every morning 9/14/19  Yes Historical Provider, MD   ibuprofen (ADVIL;MOTRIN) 200 MG tablet Take 200 mg by mouth every 8 hours as needed for Pain    Yes Historical Provider, MD   Handicap Placard [address] [facility] by Does not apply route Exp 10/30/2021 10/30/18  Yes Martinez Mckoy MD   Cholecalciferol (VITAMIN D3) 2000 units CAPS Take 2,000 Units by mouth daily   Yes Historical Provider, MD   latanoprost (XALATAN) 0.005 % ophthalmic solution Place 1 drop into both eyes nightly   Yes Historical Provider, MD   Multiple Vitamins-Minerals (PRESERVISION AREDS) CAPS Take  by mouth 2 times daily.    Yes Historical pale.   Neurological:      General: No focal deficit present. Mental Status: She is alert. Psychiatric:         Mood and Affect: Mood normal.         Cardiac Data:      Labs:     CBC: No results for input(s): WBC, HGB, HCT, PLT in the last 72 hours. BMP:No results for input(s): NA, K, CO2, BUN, CREATININE, LABGLOM, GLUCOSE in the last 72 hours. PT/INR: No results for input(s): PROTIME, INR in the last 72 hours. FASTING LIPID PANEL:  Lab Results   Component Value Date    HDL 31 11/12/2020    TRIG 98 11/12/2020     LIVER PROFILE:No results for input(s): AST, ALT, LABALBU in the last 72 hours. IMPRESSION:    Patient Active Problem List   Diagnosis    Hypertension    Hyperlipidemia    Osteopenia    Chronic bilateral low back pain without sciatica    Chronic pain of left ankle    Essential thrombocytosis (HCC)    Open wound of right elbow    Anemia    Bilateral leg edema       Assessment/Plan:  SOB. Could be angina equivalent. Plan for echo and Cardiolite stress test. Check CBC, BNP and BMP. Essential hypertension. Well controlled. Essential thrombocytosis.          Bella Anne MD  [facility]           [phone]

## 2021-01-08 NOTE — TELEPHONE ENCOUNTER
Lab called to report a critical value of the patient's immature platelet fraction. The value was read back and confirmed by the caller and writer and José Sood was informed that Dr Erica Minaya office would be notified as well. Can Dr Eileen Arnold give recommendations as Dr Jhonathan Mack is out of the office?

## 2021-01-08 NOTE — TELEPHONE ENCOUNTER
Patient started taking the hydrea about a week ago, and ever since she started the medication she has been getting increased SOB. They wanted to know if that can be a side effect to the medication?      Please advise    [phone]- please call Cornell Nicole patients daughter after Dr Mary Choe replys

## 2021-01-11 NOTE — TELEPHONE ENCOUNTER
Per Nataliia Montano, pt's family called [facility] stating they only want echo done, not stress.  Stress canceled out per their request.

## 2021-01-15 NOTE — TELEPHONE ENCOUNTER
Last Appt:  1/8/2021  Next Appt:   2/12/2021  Med verified in Epic    Pt daughter called back to give her choice for what  wanted to know

## 2021-01-22 NOTE — PROGRESS NOTES
Today's Date: 1/22/2021  Patient's Name: Thao Nielsen  Patient's age: 80 y. o., 1/26/1927    Subjective: The patient is a 80y.o. year old, , female is in the office for mitral regurgitation. Denies exertional chest pain. Does have SOB on exertion, does limit acivity some times, no dizziness or syncope. Past Medical History:   has a past medical history of Hyperlipidemia, Hypertension, Impaired fasting glucose, Macular degeneration, Osteopenia, and Urinary urgency. Past Surgical History:   has a past surgical history that includes Cataract removal with implant (Left) and Colonoscopy (11/2006). Home Medications:  Prior to Admission medications    Medication Sig Start Date End Date Taking?  Authorizing Provider   furosemide (LASIX) 20 MG tablet Take one tablet daily for three days and report if better 12/4/20   Chase Jordan MD   hydroxyurea (HYDREA) 500 MG chemo capsule Take 1 capsule by mouth daily 11/17/20   Chase Jordan MD   metoprolol tartrate (LOPRESSOR) 25 MG tablet take 1 tablet by mouth twice a day 11/6/20   Martha Gentile DO   aspirin 81 MG tablet Take 81 mg by mouth daily    Historical Provider, MD   atorvastatin (LIPITOR) 10 MG tablet TAKE ONE TABLET BY MOUTH ONCE DAILY 11/4/19   Chase Jordan MD   timolol (TIMOPTIC) 0.5 % ophthalmic solution Place 1 drop into both eyes every morning 9/14/19   Historical Provider, MD   ibuprofen (ADVIL;MOTRIN) 200 MG tablet Take 200 mg by mouth every 8 hours as needed for Pain     Historical Provider, MD   Handicap Placard MISC by Does not apply route Exp 10/30/2021 10/30/18   Chase Jordan MD   Cholecalciferol (VITAMIN D3) 2000 units CAPS Take 2,000 Units by mouth daily    Historical Provider, MD   latanoprost (XALATAN) 0.005 % ophthalmic solution Place 1 drop into both eyes nightly    Historical Provider, MD Capillary Refill: Capillary refill takes less than 2 seconds. Coloration: Skin is not jaundiced or pale. Neurological:      General: No focal deficit present. Mental Status: She is alert and oriented to person, place, and time. Cranial Nerves: No cranial nerve deficit. Sensory: No sensory deficit. Psychiatric:         Mood and Affect: Mood normal.         Behavior: Behavior normal.         Cardiac Data:      Labs:     CBC: No results for input(s): WBC, HGB, HCT, PLT in the last 72 hours. BMP:No results for input(s): NA, K, CO2, BUN, CREATININE, LABGLOM, GLUCOSE in the last 72 hours. PT/INR: No results for input(s): PROTIME, INR in the last 72 hours. FASTING LIPID PANEL:  Lab Results   Component Value Date    HDL 31 11/12/2020    TRIG 98 11/12/2020     LIVER PROFILE:No results for input(s): AST, ALT, LABALBU in the last 72 hours. IMPRESSION:    Patient Active Problem List   Diagnosis    Hypertension    Hyperlipidemia    Osteopenia    Chronic bilateral low back pain without sciatica    Chronic pain of left ankle    Essential thrombocytosis (HCC)    Open wound of right elbow    Anemia    Bilateral leg edema       Assessment/Plan:  1. SOB. Echo 1/2021 EF 60%. Severe MR. Severe TR. RVSP 36 mm Hg. Left maravilla compression of interventricular septum indicating RV pressure overload. Detailed discussion with her and her daughter about evaluation and management of mitral regurgitations, surgery vs clip vs only symptomatic treatment by medical therapy. She wants only medical therapy. She wants only medical therapy. Will increase Lasix to 40 mg po daily. Check BMP in 7 days. 2. Essential hypertension. BP is still high. Will start Lisinopril 5 mg po daily. Check BP at home and report to us. 3. Thrombocytosis. Followed by PCP.          Vanessa Jimenez MD  [facility]           [phone]

## 2021-01-26 PROBLEM — R55 SYNCOPE AND COLLAPSE: Status: ACTIVE | Noted: 2021-01-01

## 2021-01-26 NOTE — PROGRESS NOTES
RR 16  Breath Sounds: clear      · Bronchodilator assessment at level  1  · Hyperinflation assessment at level 1  · Secretion Management assessment at level  1  ·   · [x]    Bronchodilator Assessment  BRONCHODILATOR ASSESSMENT SCORE  Score 0 1 2 3 4 5   Breath Sounds   []  Patient Baseline [x]  No Wheeze good aeration []  Faint, scattered wheezing, good aeration []  Expiratory Wheezing and or moderately diminished []  Insp/Exp wheeze and/or very diminished []  Insp/Exp and/ or marked distress   Respiratory Rate   []  Patient Baseline [x]  Less than 20 []  Less than 20 []  20-25 []  Greater than 25 []  Greater than 25   Peak flow % of Pred or PB []  NA   []  Greater than 90%  []  81-90% []  71-80% [x]  Less than or equal to 70%  or unable to perform []  Unable due to Respiratory Distress   Dyspnea re []  Patient Baseline [x]  No SOB []  No SOB []  SOB on exertion []  SOB min activity []  At rest/acute   e FEV% Predicted       []  NA []  Above 69%  []  Unable []  Above 60-69%  []  Unable []  Above 50-59%  []  Unable []  Above 35-49%  []  Unable []  Less than 35%  []  Unable                 [x]  Hyperinflation Assessment  Score 1 2 3   CXR and Breath Sounds   [x]  Clear []  No atelectasis  Basilar aeration []  Atelectasis or absent basilar breath sounds   Incentive Spirometry Volume  (Per IBW)   [x]  Greater than or equal to 15ml/Kg []  less than 15ml/Kg []  less than 15ml/Kg   Surgery within last 2 weeks [x]  None or general   []  Abdominal or thoracic surgery  []  Abdominal or thoracic   Chronic Pulmonary Historyre [x]  No []  Yes []  Yes     [x]  Secretion Management Assessment  Score 1 2 3   Bilateral Breath Sounds   [x]  Occasional Rhonchi []  Scattered Rhonchi []  Course Rhonchi and/or poor aeration   Sputum    [x]  Small amount of thin secretions []  Moderate amount of viscous secretions []  Copius, Viscious Yellow/ Secretions   CXR as reported by physician [x]  clear  []  Unavailable [] Infiltrates and/or consolidation  []  Unavailable []  Mucus Plugging and or lobar consolidation  []  Unavailable   Cough [x]  Strong, productive cough []  Weak productive cough []  No cough or weak non-productive cough   [facility]  4:51 PM                            FEMALE                                  MALE                            FEV1 Predicted Normal Values                        FEV1 Predicted Normal Values          Age                                     Height in Feet and Inches       Age                                     Height in Feet and Inches       4' 11\" 5' 1\" 5' 3\" 5' 5\" 5' 7\" 5' 9\" 5' 11\" 6' 1\"  4' 11\" 5' 1\" 5' 3\" 5' 5\" 5' 7\" 5' 9\" 5' 11\" 6' 1\"   42 - 45 2.49 2.66 2.84 3.03 3.22 3.42 3.62 3.83 42 - 45 2.82 3.03 3.26 3.49 3.72 3.96 4.22 4.47   46 - 49 2.40 2.57 2.76 2.94 3.14 3.33 3.54 3.75 46 - 49 2.70 2.92 3.14 3.37 3.61 3.85 4.10 4.36   50 - 53 2.31 2.48 2.66 2.85 3.04 3.24 3.45 3.66 50 - 53 2.58 2.80 3.02 3.25 3.49 3.73 3.98 4.24   54 - 57 2.21 2.38 2.57 2.75 2.95 3.14 3.35 3.56 54 - 57 2.46 2.67 2.89 3.12 3.36 3.60 3.85 4.11   58 - 61 2.10 2.28 2.46 2.65 2.84 3.04 3.24 3.45 58 - 61 2.32 2.54 2.76 2.99 3.23 3.47 3.72 3.98   62 - 65 1.99 2.17 2.35 2.54 2.73 2.93 3.13 3.34 62 - 65 2.19 2.40 2.62 2.85 3.09 3.33 3.58 3.84   66 - 69 1.88 2.05 2.23 2.42 2.61 2.81 3.02 3.23 66 - 69 2.04 2.26 2.48 2.71 2.95 3.19 3.44 3.70   70+ 1.82 1.99 2.17 2.36 2.55 2.75 2.95 3.16 70+ 1.97 2.19 2.41 2.64 2.87 3.12 3.37 3.62             Predicted Peak Expiratory Flow Rate                                       Height (in)  Female       Height (in) Male           Age 64 62 64 63 59 77 76 79 Age            20 432 579 485 639 920 908 701 229  27 67 01 01 80 42 42 33 85   25 337 352 366 381 396 411 426 441 25 447 476 505 533 562 591 619 648 677   30 329 344 359 374 389 404 419 434 30 437 466 494 523 552 580 609 638 667   35 322 337 351 366 381 396 411 426 35 426 455 484 512 541 570 537 730 017 24 057 374 852 373 555 441 052 011 19 540 670 088 620 501 852 280 199 142   33 519 481 150 908 184 749 452 410 95 137 091 698 382 064 960 859 079 014   17 169 016 464 728 245 679 955 318 13 732 549 071 664 707 485 310 101 214   39 020 559 371 996 988 963 442 338 70 262 672 557 672 139 551 973 100 562   45 442 660 519 648 838 931 320 264 70 213 350 737 413 785 637 728 962 282   81 436 600 565 043 819 888 985 123 11 831 921 083 449 478 507 535 564 594   70 269 284 299 314 329 344 359 374 70 353 382 410 439 468 496 525 554 583   75 261 274 289 305 319 334 348 364 75 344 372 400 429 458 487 515 544 573   80 253 266 282 296 312 327 342 356 80 335 362 390 419 448 476 505 534 562

## 2021-01-26 NOTE — ED NOTES
Pt assisted up to and back from bedside commode. Melodie care and brief change completed. Pt tolerated well. Resp even and NL.      Chino Quesada RN  01/26/21 [id number]

## 2021-01-26 NOTE — ED PROVIDER NOTES
[facility]  eMERGENCY dEPARTMENT eNCOUnter      Pt Name: Keyana Acosta  MRN: [id number]  Sauravgfurt 1/26/1927  Date of evaluation: 1/26/2021      CHIEF COMPLAINT       Chief Complaint   Patient presents with    Fall     found on floor this AM, denies injury         HISTORY OF PRESENT ILLNESS    Keyana Acosta is a 80 y.o. female who presents with a fall she was found this morning on the floor she denies any injury is not sure how she ended up on the floor or how long she was there. Patient says she is felt a little more fatigued than usual she did receive her Covid vaccination Saturday  There is been no nausea no vomiting no cough no shortness of breath    REVIEW OF SYSTEMS         Review of Systems   Constitutional: Positive for fatigue. Negative for chills and fever. HENT: Negative for congestion and ear pain. Eyes: Negative for pain and visual disturbance. Respiratory: Negative for cough and shortness of breath. Cardiovascular: Negative for chest pain, palpitations and leg swelling. Gastrointestinal: Negative for abdominal pain, constipation, diarrhea, nausea and vomiting. Endocrine: Negative for polydipsia and polyuria. Genitourinary: Negative for difficulty urinating, dysuria and frequency. Musculoskeletal: Negative for back pain, joint swelling, myalgias, neck pain and neck stiffness. Skin: Negative for rash. Neurological: Negative for dizziness, weakness and headaches. Hematological: Negative for adenopathy. Does not bruise/bleed easily. Psychiatric/Behavioral: Negative for confusion, self-injury and suicidal ideas. PAST MEDICAL HISTORY    has a past medical history of Hyperlipidemia, Hypertension, Impaired fasting glucose, Macular degeneration, Osteopenia, and Urinary urgency. SURGICAL HISTORY      has a past surgical history that includes Cataract removal with implant (Left) and Colonoscopy (11/2006).     CURRENT MEDICATIONS       Discharge Medication List as of 1/30/2021  3:44 PM      CONTINUE these medications which have NOT CHANGED    Details   hydroxyurea (HYDREA) 500 MG chemo capsule Take 1 capsule by mouth daily, Disp-30 capsule,R-3Normal      metoprolol tartrate (LOPRESSOR) 25 MG tablet take 1 tablet by mouth twice a day, Disp-180 tablet,R-3Normal      aspirin 81 MG tablet Take 81 mg by mouth dailyHistorical Med      atorvastatin (LIPITOR) 10 MG tablet TAKE ONE TABLET BY MOUTH ONCE DAILY, Disp-90 tablet, R-3Normal      timolol (TIMOPTIC) 0.5 % ophthalmic solution Place 1 drop into both eyes every morning, R-6Historical Med      Handicap Placard MISC Starting Tue 10/30/2018, Disp-1 each, R-0, PrintExp 10/30/2021      latanoprost (XALATAN) 0.005 % ophthalmic solution Place 1 drop into both eyes nightlyHistorical Med      Multiple Vitamins-Minerals (PRESERVISION AREDS) CAPS Take  by mouth 2 times daily. Calcium 1500 MG TABS daily. Cholecalciferol (VITAMIN D3) 2000 units CAPS Take 2,000 Units by mouth dailyHistorical Med             ALLERGIES     is allergic to macrobid [nitrofurantoin] and sulfa antibiotics. FAMILY HISTORY     She indicated that the status of her mother is unknown. She indicated that the status of her father is unknown. She indicated that the status of her brother is unknown.     family history includes Cancer in her brother and mother; Heart Attack in her father; Heart Disease in her father. SOCIAL HISTORY      reports that she has never smoked. She has never used smokeless tobacco. She reports that she does not drink alcohol or use drugs. PHYSICAL EXAM     INITIAL VITALS:  height is 5' 2\" (1.575 m) and weight is 130 lb 3.2 oz (59.1 kg). Her axillary temperature is 97.7 °F (36.5 °C). Her blood pressure is 121/80 and her pulse is 108. Her respiration is 20 and oxygen saturation is 100%. Physical Exam  Constitutional:       General: She is not in acute distress. Appearance: Normal appearance.  She is well-developed. She is not ill-appearing, toxic-appearing or diaphoretic. HENT:      Head: Normocephalic and atraumatic. Eyes:      Conjunctiva/sclera: Conjunctivae normal.      Pupils: Pupils are equal, round, and reactive to light. Neck:      Musculoskeletal: Normal range of motion. Cardiovascular:      Rate and Rhythm: Normal rate and regular rhythm. Pulmonary:      Effort: Pulmonary effort is normal.      Breath sounds: Normal breath sounds. Abdominal:      General: Bowel sounds are normal.      Palpations: Abdomen is soft. Musculoskeletal:         General: No tenderness. Skin:     General: Skin is warm and dry. Neurological:      General: No focal deficit present. Mental Status: She is alert and oriented to person, place, and time.    Psychiatric:         Behavior: Behavior normal.           DIFFERENTIAL DIAGNOSIS/ MDM:     Fever fatigue recent fall will rule out any traumatic injury will rule out rhabdomyolysis rule out causes of infection    DIAGNOSTIC RESULTS     EKG: All EKG's are interpreted by the Emergency Department Physician who either signs or Co-signs this chart in the absence of a cardiologist.  Sinus rhythm with PACs rate of 98 bpm, OR interval is 188 ms, QRS duration is 132 ms, QT corrected 451 ms, axis 64 is no acute ST elevation or depression she has some nonspecific ST changes and also has a right bundle branch block pattern which was seen on previous EKGs  EKG shows normal sinus rhythm with sinus arrhythmia rate of 99 bpm OR interval is 190 ms QRS durations 136 ms QT corrected 449 ms axis is 65 is no acute ST or T wave changes seen    RADIOLOGY:   I directly visualized the following  images and reviewed the radiologist interpretations:       EXAMINATION:   CT OF THE HEAD WITHOUT CONTRAST 1/26/2021 10:18 am       TECHNIQUE:   CT of the head was performed without the administration of intravenous   contrast. Dose modulation, iterative reconstruction, and/or weight based adjustment of the mA/kV was utilized to reduce the radiation dose to as low   as reasonably achievable.       COMPARISON:   December 31, 2011       HISTORY:   ORDERING SYSTEM PROVIDED HISTORY: found down   TECHNOLOGIST PROVIDED HISTORY:       found down   Reason for Exam: fall, pt states she did not hit her head   Acuity: Acute   Type of Exam: Initial       FINDINGS:   Evaluation is limited by motion.       No acute intracranial hemorrhage.  No mass effect.  No midline shift. Moderate prominence of the ventricles and sulci is consistent with atrophy. Moderate periventricular and subcortical white matter hypodensities are   nonspecific but likely represent microvascular disease.  Remote left basal   ganglia lacunar infarct.       No acute soft tissue abnormality.  No acute osseous abnormality.  No   significant paranasal sinus disease.           Impression   No acute intracranial findings.              EXAMINATION:   ONE XRAY VIEW OF THE CHEST       1/26/2021 8:06 am       COMPARISON:   05/04/2020, 05/05/2011       HISTORY:   ORDERING SYSTEM PROVIDED HISTORY: fever   TECHNOLOGIST PROVIDED HISTORY:   fever   Reason for Exam: fall   Acuity: Acute   Type of Exam: Initial       FINDINGS:   Lungs are hypoinflated but clear.  No pneumothorax.  Probable small left   greater than right pleural effusions.  Cardiomegaly.  Diffuse osseous   demineralization with degenerative changes.  No grossly displaced rib   fractures.           Impression   Hypoventilated lungs with probable small left greater than right pleural   effusions.       Cardiomegaly.      CT of C-spine shows degenerative changes no acute fracture is seen    ED BEDSIDE ULTRASOUND:       LABS:  Labs Reviewed   CULTURE, BLOOD 1 - Abnormal; Notable for the following components:       Result Value    Culture   (*)     Value: POSITIVE Blood Culture Results called to and read back by: [facility] [id number]    Culture STAPHYLOCOCCUS EPIDERMIDIS (*)     All other components within normal limits   CULTURE, BLOOD 1 - Abnormal; Notable for the following components:    Culture   (*)     Value: POSITIVE Blood Culture Results called to and read back by: Valeria Dick AT [facility] AT 0800 1/27/21    Culture   (*)     Value: Detected: Staphylococcus epidermidis Detected: mecA/C Gene Detected- Methicillin Resistant Organism Methodology- Polymerase Chain Reaction (PCR)    Culture STAPHYLOCOCCUS EPIDERMIDIS (*)     All other components within normal limits   CULTURE, URINE - Abnormal; Notable for the following components:    Culture ESCHERICHIA COLI >[id number] CFU/ML (*)     Culture ENTEROCOCCUS FAECALIS >166579 CFU/ML (*)     All other components within normal limits   PROTIME-INR - Abnormal; Notable for the following components:    Protime 18.8 (*)     All other components within normal limits   URINE RT REFLEX TO CULTURE - Abnormal; Notable for the following components:    Urine Hgb 1+ (*)     Protein, UA 1+ (*)     Leukocyte Esterase, Urine TRACE (*)     All other components within normal limits   LACTATE, SEPSIS - Abnormal; Notable for the following components:    Lactic Acid, Sepsis 3.8 (*)     All other components within normal limits   LACTATE, SEPSIS - Abnormal; Notable for the following components:    Lactic Acid, Sepsis 3.8 (*)     All other components within normal limits   CBC WITH AUTO DIFFERENTIAL - Abnormal; Notable for the following components:    RBC 3.18 (*)     Hemoglobin 8.1 (*)     Hematocrit 27.7 (*)     RDW 27.6 (*)     Lymphocytes 14 (*)     Seg Neutrophils 80 (*)     Eosinophils % 0 (*)     Immature Granulocytes 2 (*)     All other components within normal limits   COMPREHENSIVE METABOLIC PANEL - Abnormal; Notable for the following components:    Glucose 119 (*)     CREATININE 0.96 (*)     Bun/Cre Ratio 23 (*)     Sodium 131 (*)     Potassium 3.4 (*)     Chloride 94 (*)     AST 54 (*)     Total Bilirubin 2.81 (*)     GFR Non- 54 (*)     All other components within normal limits   MYOGLOBIN, SERUM - Abnormal; Notable for the following components:    Myoglobin 312 (*)     All other components within normal limits   TROPONIN - Abnormal; Notable for the following components:    Troponin, High Sensitivity 62 (*)     All other components within normal limits   TROPONIN - Abnormal; Notable for the following components:    Troponin, High Sensitivity 60 (*)     All other components within normal limits   MICROSCOPIC URINALYSIS - Abnormal; Notable for the following components:    Bacteria, UA 2+ (*)     Amorphous, UA 2+ (*)     Other Observations UA Specimen Cultured (*)     All other components within normal limits   LACTIC ACID - Abnormal; Notable for the following components:    Lactic Acid 4.2 (*)     All other components within normal limits   MYOGLOBIN, SERUM - Abnormal; Notable for the following components:    Myoglobin 256 (*)     All other components within normal limits   MYOGLOBIN, SERUM - Abnormal; Notable for the following components:    Myoglobin 182 (*)     All other components within normal limits   TROPONIN - Abnormal; Notable for the following components:    Troponin, High Sensitivity 61 (*)     All other components within normal limits   TROPONIN - Abnormal; Notable for the following components:    Troponin, High Sensitivity 57 (*)     All other components within normal limits   MYOGLOBIN, SERUM - Abnormal; Notable for the following components:    Myoglobin 197 (*)     All other components within normal limits   BASIC METABOLIC PANEL W/ REFLEX TO MG FOR LOW K - Abnormal; Notable for the following components:    BUN 24 (*)     Bun/Cre Ratio 28 (*)     Calcium 8.0 (*)     Sodium 134 (*)     All other components within normal limits   CBC - Abnormal; Notable for the following components:    RBC 3.10 (*)     Hemoglobin 7.8 (*)     Hematocrit 27.8 (*)     RDW 27.8 (*)     All other components within normal limits   CBC WITH AUTO DIFFERENTIAL - Abnormal; Notable for the following components:    RBC 2.88 (*)     Hemoglobin 7.4 (*)     Hematocrit 25.5 (*)     RDW 27.9 (*)     Lymphocytes 19 (*)     Seg Neutrophils 77 (*)     Eosinophils % 0 (*)     Immature Granulocytes 1 (*)     All other components within normal limits   BASIC METABOLIC PANEL - Abnormal; Notable for the following components:    BUN 26 (*)     Bun/Cre Ratio 32 (*)     Sodium 129 (*)     Potassium 3.5 (*)     All other components within normal limits   HEMOGLOBIN AND HEMATOCRIT, BLOOD - Abnormal; Notable for the following components:    Hemoglobin 7.5 (*)     Hematocrit 25.4 (*)     All other components within normal limits   BASIC METABOLIC PANEL - Abnormal; Notable for the following components:    Glucose 124 (*)     BUN 28 (*)     Bun/Cre Ratio 35 (*)     All other components within normal limits   CBC WITH AUTO DIFFERENTIAL - Abnormal; Notable for the following components:    RBC 2.98 (*)     Hemoglobin 7.7 (*)     Hematocrit 26.2 (*)     RDW 27.9 (*)     Lymphocytes 18 (*)     Seg Neutrophils 76 (*)     Eosinophils % 0 (*)     Immature Granulocytes 1 (*)     All other components within normal limits   IMMATURE PLATELET FRACTION - Abnormal; Notable for the following components:    Platelet, Immature Fraction 10.9 (*)     All other components within normal limits   CBC WITH AUTO DIFFERENTIAL - Abnormal; Notable for the following components:    RBC 3.64 (*)     Hemoglobin 9.3 (*)     Hematocrit 33.0 (*)     RDW 29.6 (*)     Lymphocytes 16 (*)     Seg Neutrophils 78 (*)     Immature Granulocytes 1 (*)     All other components within normal limits   BASIC METABOLIC PANEL - Abnormal; Notable for the following components:    Glucose 127 (*)     BUN 31 (*)     Bun/Cre Ratio 36 (*)     Sodium 133 (*)     All other components within normal limits   COVID-19   POTASSIUM   IMMATURE PLATELET FRACTION       Troponin, remained stable    EMERGENCY DEPARTMENT COURSE:   Vitals:    Vitals:    01/30/21 0624 01/30/21 0630 01/30/21 0750 01/30/21 1215   BP:  121/80     Pulse:  108     Resp: 24 20     Temp:  97.7 °F (36.5 °C)     TempSrc:  Axillary     SpO2:  95% 92% 100%   Weight:       Height:         -------------------------  BP: 121/80, Temp: 97.7 °F (36.5 °C), Pulse: 108, Resp: 20    Fever    Re-evaluation Notes    Resting more comfortably taking fluids well    CRITICAL CARE:   IP CONSULT TO HOSPITALIST  IP CONSULT TO CARDIOLOGY  IP CONSULT TO CARDIOLOGY  PHARMACY TO DOSE VANCOMYCIN        CONSULTS: Discussed the case with cardiology Dr. Leonor arevalo he will follow      PROCEDURES:  None    FINAL IMPRESSION      1. Fall, initial encounter    2. Acute cystitis without hematuria    3. Elevated troponin    4. SOB (shortness of breath)          DISPOSITION/PLAN   DISPOSITION admitted    Condition on Disposition    Stable    PATIENT REFERRED TO:  No follow-up provider specified. DISCHARGE MEDICATIONS:  Discharge Medication List as of 1/30/2021  3:44 PM      START taking these medications    Details   albuterol (PROVENTIL) (2.5 MG/3ML) 0.083% nebulizer solution Take 3 mLs by nebulization every 6 hours as needed for Wheezing, Disp-120 each, R-0NO PRINT      sodium chloride 1 g tablet Take 1 tablet by mouth daily, Disp-90 tablet, R-3NO PRINT      melatonin 3 MG TABS tablet Take 1 tablet by mouth nightly as needed (sleep), Disp-30 mg, R-0NO PRINT      levoFLOXacin (LEVAQUIN) 250 MG tablet Take 1 tablet by mouth daily for 7 days, Disp-7 tablet, R-0NO PRINT      loperamide (IMODIUM) 2 MG capsule Take 1 capsule by mouth 3 times daily as needed for Diarrhea, Disp-30 capsule, R-0NO PRINT      miconazole (MICOTIN) 2 % powder Apply topically 2 times daily. , Disp-45 g, R-1, NO PRINT             (Please note that portions of this note were completed with a voice recognition program.  Efforts were made to edit the dictations but occasionally words are mis-transcribed.)    Jimenez MD, F.A.A.E.M.   Attending Emergency Physician Tamiko Kidd MD  01/26/21 [id number]       Tamiko Kidd MD  05/09/21 [id number]

## 2021-01-26 NOTE — FLOWSHEET NOTE
rounding on ED    Assessment: Patient is waiting for transfer to PCU. Intervention: Engaged in conversation. Patient expressed appreciation for visit and offer of continued prayer. Plan: Chaplains are available on site or on call 24/7 for spiritual and emotional support. 01/26/21 1354   Encounter Summary   Services provided to: Patient and family together   Referral/Consult From: [address] [place] [address] Family members; Sikhism/rajinder community   [facility] of Confucianism   ([facility]   Contact Sikhism Completed   Continue Visiting   (1/26/21)   Complexity of Encounter Moderate   Length of Encounter 15 minutes   Spiritual/Gnosticism   Type Spiritual support   Assessment Approachable   Intervention Active listening;Sustaining presence/ Ministry of presence   Outcome Expressed gratitude;Engaged in conversation

## 2021-01-26 NOTE — H&P
HOSPITALIST ADMISSION H&P      REASON FOR ADMISSION:    Syncope--fall--elevated troponin--UTI POA  ESTIMATED LENGTH OF STAY:  > 2 midnights----2-3 days    ATTENDING/ADMITTING PHYSICIAN: Kasandra Brennan MD  PCP: Rosetta Harvey MD    HISTORY OF PRESENT ILLNESS:      The patient is a 80 y.o. female patient of Rosetta Harvey MD who presents with fall at home--possible syncope got up out of bed walked several steps and then went to the floor---does not think that she passed out, but cannot account for her activity before the fall----says was on the floor for a \"short period of time. \"     UTI---POA    CUADRA--dyspnea at rest---has been chronic, possibly more acute today--is being worked up by [facility] outpatient    CKD--3    Thrombocytosis---on hydroxyurea---controlled  = 392       See below for additional PMH.     Patient bqzp-vugzgflyre-reymwyyl-available records reviewed, including, but not limited to,   ER reports--labs--imaging--EKGs--office records----personal notes       Past Medical History:   Diagnosis Date    Hyperlipidemia     Hypertension     Impaired fasting glucose     Macular degeneration     Osteopenia     Urinary urgency            Past Surgical History:   Procedure Laterality Date    CATARACT REMOVAL WITH IMPLANT Left     COLONOSCOPY  11/2006    severe diverticulosis       Medications Prior to Admission:    Medications Prior to Admission: furosemide (LASIX) 40 MG tablet, Take 1 tablet by mouth daily  lisinopril (PRINIVIL;ZESTRIL) 5 MG tablet, Take 1 tablet by mouth daily  hydroxyurea (HYDREA) 500 MG chemo capsule, Take 1 capsule by mouth daily  metoprolol tartrate (LOPRESSOR) 25 MG tablet, take 1 tablet by mouth twice a day  aspirin 81 MG tablet, Take 81 mg by mouth daily  atorvastatin (LIPITOR) 10 MG tablet, TAKE ONE TABLET BY MOUTH ONCE DAILY  timolol (TIMOPTIC) 0.5 % ophthalmic solution, Place 1 drop into both eyes every morning ibuprofen (ADVIL;MOTRIN) 200 MG tablet, Take 200 mg by mouth every 8 hours as needed for Pain   Handicap Placard MISC, by Does not apply route Exp 10/30/2021  latanoprost (XALATAN) 0.005 % ophthalmic solution, Place 1 drop into both eyes nightly  Multiple Vitamins-Minerals (PRESERVISION AREDS) CAPS, Take  by mouth 2 times daily. Calcium 1500 MG TABS, daily. Cholecalciferol (VITAMIN D3) 2000 units CAPS, Take 2,000 Units by mouth daily    Allergies:    Macrobid [nitrofurantoin] and Sulfa antibiotics    Social History:    reports that she has never smoked. She has never used smokeless tobacco. She reports that she does not drink alcohol or use drugs. Family History:   family history includes Cancer in her brother and mother; Heart Attack in her father; Heart Disease in her father. REVIEW OF SYSTEMS:  See HPI and problem list; otherwise no other new complaints with respect to HEENT, neck, pulmonary, coronary, GI, , endocrine, musculoskeletal, immune system/connective tissue disease, hematologic, neuropsych, skin, lymphatics, or malignancies. PHYSICAL EXAM:  Vitals:  /69   Pulse 92   Temp 100.2 °F (37.9 °C) (Tympanic)   Resp 16   Ht 5' 2\" (1.575 m)   Wt 124 lb 3.2 oz (56.3 kg)   SpO2 94%   BMI 22.72 kg/m²     HEENT: Normocephalic and Atraumatic  Neck: Supple, No Masses, Tenderness, Nodularity and No Lymphadenopathy  Chest/Lungs: Poor Air Movement and Distant Breath Sounds  Cardiac: Regular Rate and Rhythm  GI/Abdomen:  Bowel Sounds Present and Soft, Non-tender, without Guarding or Rebound Tenderness  : Not examined  EXT/Skin: No Cyanosis, No Clubbing and Edema Present---mild  Neuro: alert---generalized weakness---gait balance instability----otherwise and No Localizing Signs/Symptoms        LABS:    CBC with Differential:    Lab Results   Component Value Date    WBC 8.4 01/26/2021    RBC 3.18 01/26/2021    HGB 8.1 01/26/2021    HCT 27.7 01/26/2021     01/26/2021    MCV 87.1 01/26/2021 MCH 25.5 01/26/2021    MCHC 29.2 01/26/2021    RDW 27.6 01/26/2021    NRBC 2 12/04/2020    LYMPHOPCT 14 01/26/2021    MONOPCT 3 01/26/2021    BASOPCT 1 01/26/2021    MONOSABS 0.25 01/26/2021    LYMPHSABS 1.18 01/26/2021    EOSABS 0.00 01/26/2021    BASOSABS 0.08 01/26/2021    DIFFTYPE NOT REPORTED 01/26/2021     BMP:    Lab Results   Component Value Date     01/26/2021    K 3.4 01/26/2021    CL 94 01/26/2021    CO2 24 01/26/2021    BUN 22 01/26/2021    LABALBU 3.6 01/26/2021    CREATININE 0.96 01/26/2021    CALCIUM 9.1 01/26/2021    GFRAA >60 01/26/2021    LABGLOM 54 01/26/2021    GLUCOSE 119 01/26/2021       ASSESSMENT:      Patient Active Problem List   Diagnosis    Hypertension    Hyperlipidemia    Osteopenia    Chronic bilateral low back pain without sciatica    Chronic pain of left ankle    Essential thrombocytosis (HCC)    Open wound of right elbow    Anemia    Bilateral leg edema    Syncope and collapse    Fall     NICOLE MARIN      [[facility]  89  WF  [[facility] ;  [facility]]  FULL CODE     LOVENOX 30           COVID19---NEGATIVEModerna vaccine #5[id number]    Anti-infectives:   Rocephin IV    Syncope---1.26.2021  Fall1.26.2021  Elevated troponin---1.26.2021         CXR1.26.2021hypoventilated lungssmall left > right pleural effusions---cardiomegaly         CT head----1.26.2021no MBSmicrovascular disease         CT---c-spine---1.26.2021cervical degenerative diseaseno traumatic findings         EKG1.26.2021SR98---PACsRBBB         2D ECHO---1.13.2021---LA severely dilatedNLVSF---abnormal septal wall motionleftward                                   compress IVS D-sign RV pressure overload---RA severely dilated                                   RV dilatation---reduced RVSFBROWN but opens well----MACsevere MR---                                   severe TR---normal AR 3.3 cmincreased IVC diameterimpaired or no respiratory variation---RVSP  ~ 36 mm HgLVEF ~ 60%  UTIPOA---1.26.2021  Elevated lactic acid level---1.26.2021----sepsis ruled out----1.26.2021  CKD---3  CHFchronic diastolicsevere valvular heart disease  Hypertension  Hyperlipidemia   Impaired fasting glucose  Non-rheumatic MR  CKD  Anemia---chronic  Thrombocytosis---chronic---hydroxyurea  Hypokalemia  Hyponatremia   PMH:   BLE edema, chronic bilateral lower back pain, chronic pain left ankle, macular degeneration,              open wound right ankle, osteopenia, urinary urgency  PSH:    colonoscopy2006, left cataract---IOL      Allergies:  Macrobidnitrofurantoin----rash,  sulfa---rash    PLAN:    1.  Fall-syncope--elevated troponin----ACS regimen---Cardiology---has had a recent 2D ECHO---no repeat  2. Lactic acidosis---IVF--sepsis ruled out  3. CHF---diastolic with severe valvular heart disease---see above--Lasix IV  4. Sodium replacement  5. Potassium replacement  6. Home medications reviewed  7. IFG---diabetic regimen as required  8. Check myoglobin due to fall  9. Home medications reviewed  10. UTI---POA---Rocephin IV pending culture  11.   See orders     Note that over 50 minutes was spent in evaluation of the patient, review of the chart and pertinent records, discussion with family/staff, etc    Vinod Wu MD  4:05 PM  1/26/2021

## 2021-01-27 NOTE — CONSULTS
[facility]    Consult                        Today's Date: 1/27/2021  Patient Name: Courtney Billy  Date of admission: 1/26/2021  9:38 AM  Patient's age: 80 y.o., 1/26/1927  Admission Dx: Syncope and collapse [R55]    Reason for Consult:  Cardiac evaluation    Requesting Physician: Tay Crane    CHIEF COMPLAINT:  Fall    History Obtained From:  patient, electronic medical record    HISTORY OF PRESENT ILLNESS:      The patient is a 80 y.o.  female who is admitted to the hospital for a fall. She got out of the bed and walked several steps and then fall to the floor. She does not think she passed out but not sure. She denies any chest pain. She has chronic dyspnea on exertion. She has severe mitral regurgitation and tricuspid regurgitation with RV pressure overload. She sees Dr. Love Verduzco and detail discussion has been done regarding management options with agreement on medical therapy only. Recently lisinopril 5mg po qday was added a week ago due to high BP. She has thrombocytosis and is on hydroxyurea.  and now 134    HS trop 62-60-61 flat trend    Hgb 7.8    Chest xray- findings of pulmonary edema    Past Medical History:   has a past medical history of Hyperlipidemia, Hypertension, Impaired fasting glucose, Macular degeneration, Osteopenia, and Urinary urgency. Past Surgical History:   has a past surgical history that includes Cataract removal with implant (Left) and Colonoscopy (11/2006). Home Medications:    Prior to Admission medications    Medication Sig Start Date End Date Taking?  Authorizing Provider   furosemide (LASIX) 40 MG tablet Take 1 tablet by mouth daily 1/22/21  Yes Analisa Marinelli MD   lisinopril (PRINIVIL;ZESTRIL) 5 MG tablet Take 1 tablet by mouth daily 1/22/21  Yes Analisa Marinelli MD   hydroxyurea (HYDREA) 500 MG chemo capsule Take 1 capsule by mouth daily 11/17/20  Yes Emely Caceres MD · Musculoskeletal:  No gait disturbance, No weakness or joint complaints. · Integumentary: No rash or pruritis. · Neurological: No headache or diplopia. No tingling  · Psychiatric: No anxiety, or depression. · Endocrine: No temperature intolerance. · Hematologic/Lymphatic: No abnormal bruising or bleeding, blood clots or swollen lymph nodes. · Allergic/Immunologic: No nasal congestion or hives. PHYSICAL EXAM:      /68   Pulse 94   Temp 97.9 °F (36.6 °C) (Oral)   Resp 28   Ht 5' 2\" (1.575 m)   Wt 124 lb 3.2 oz (56.3 kg)   SpO2 95%   BMI 22.72 kg/m²    Constitutional and General Appearance: alert, cooperative, no distress and appears stated age  HEENT: PERRL, no cervical lymphadenopathy. No masses palpable. Normal oral mucosa  Respiratory:  · Normal excursion and expansion without use of accessory muscles  · Resp Auscultation: Good respiratory effort. No for increased work of breathing. On auscultation: clear to auscultation bilaterally  Cardiovascular:  · Heart tones are crisp and normal. regular S1 and S2.  · Jugular venous pulsation Normal  · The carotid upstroke is normal in amplitude and contour without delay or bruit   Abdomen:   · soft  · Bowel sounds present  Extremities:  ·  No edema  Neurological:  · Alert and oriented. DATA:    Diagnostics:    EKG: SR, frequent PACs    TTE 01/13/2021  Summary  Normal left ventricular diameter. Left ventricular systolic function is normal.  Left ventricular ejection fraction 60 %. Abnormal septal wall motion. Leftward compression of inter-ventricular septum (\"D-sign\") indicating RV pressure overload. Left atrium is severely dilated. Right atrium is severely dilated . Right ventricular dilatation with reduced systolic function. Aortic valve is sclerotic but opens well. Mild aortic insufficiency. Mitral annular calcification. Severe mitral regurgitation. Normal tricuspid valve leaflets with poor coaptation. Severe tricuspid regurgitation. Estimated right ventricular systolic pressure is 36 mmHg. Trivial pericardial effusion. Labs:     CBC:   Recent Labs     01/26/21  1024 01/27/21  0510   WBC 8.4 7.8   HGB 8.1* 7.8*   HCT 27.7* 27.8*    360     BMP:   Recent Labs     01/26/21  1024 01/27/21  0510   * 134*   K 3.4* 3.7   CO2 24 20   BUN 22 24*   CREATININE 0.96* 0.85   LABGLOM 54* >60   GLUCOSE 119* 90     BNP: No results for input(s): BNP in the last 72 hours. PT/INR:   Recent Labs     01/26/21  0956   PROTIME 18.8*   INR 1.6     APTT:No results for input(s): APTT in the last 72 hours. CARDIAC ENZYMES:No results for input(s): CKTOTAL, CKMB, CKMBINDEX, TROPONINI in the last 72 hours. FASTING LIPID PANEL:  Lab Results   Component Value Date    HDL 31 11/12/2020    TRIG 98 11/12/2020     LIVER PROFILE:  Recent Labs     01/26/21  1024   AST 54*   ALT 17   LABALBU 3.6       IMPRESSION:    Patient Active Problem List   Diagnosis    Hypertension    Hyperlipidemia    Osteopenia    Chronic bilateral low back pain without sciatica    Chronic pain of left ankle    Essential thrombocytosis (HCC)    Open wound of right elbow    Anemia    Bilateral leg edema    Syncope and collapse    Fall     Fall  Hyponatremia- improved  Hypokalemia- resolved  Anemia  Minimal HS trop elevation, doubt ACS  Preserved LV systolic function on TTE 8/4806  Acute on chronic diastolic/valvular CHF  Severe mitral regurgitation  Severe tricuspid regurgitation  UTI  HTN    RECOMMENDATIONS:  1. Obtain orthostatics  2. IV Lasix 20mg qday. Monitor electrolytes and replace. 3. Continue metoprolol  4. Reduce lisinopril to 2.5mg po qday-allow SBP to be around 140s  5. Compression stocking  6. Conservative management for valve disease      Discussed with patient and Nurse.     Terri Dash [facility] Cardiology Consult           [phone]

## 2021-01-27 NOTE — PLAN OF CARE
Problem: Falls - Risk of:  Goal: Will remain free from falls  Description: Will remain free from falls  Outcome: Ongoing  Goal: Absence of physical injury  Description: Absence of physical injury  Outcome: Ongoing     Problem: Sensory:  Goal: General experience of comfort will improve  Description: General experience of comfort will improve  Outcome: Ongoing     Problem: Urinary Elimination:  Goal: Signs and symptoms of infection will decrease  Description: Signs and symptoms of infection will decrease  Outcome: Ongoing  Goal: Ability to reestablish a normal urinary elimination pattern will improve - after catheter removal  Description: Ability to reestablish a normal urinary elimination pattern will improve  Outcome: Ongoing  Goal: Complications related to the disease process, condition or treatment will be avoided or minimized  Description: Complications related to the disease process, condition or treatment will be avoided or minimized  Outcome: Ongoing

## 2021-01-27 NOTE — PROGRESS NOTES
Occupational Therapy   Occupational Therapy Initial Assessment  Date: 2021   Patient Name: Rustam Santiago  MRN: [id number]     : 1927    Date of Service: 2021    Discharge Recommendations:  Home with assist PRN       Assessment   Performance deficits / Impairments: Decreased functional mobility ; Decreased ADL status; Decreased safe awareness;Decreased endurance;Decreased balance;Decreased high-level IADLs  Treatment Diagnosis: general weakness  Prognosis: Good  Decision Making: Low Complexity  REQUIRES OT FOLLOW UP: Yes  Safety Devices  Safety Devices in place: Yes  Type of devices: Call light within reach; Left in bed;Nurse notified           Patient Diagnosis(es): The primary encounter diagnosis was Fall, initial encounter. Diagnoses of Acute cystitis without hematuria and Elevated troponin were also pertinent to this visit. has a past medical history of Hyperlipidemia, Hypertension, Impaired fasting glucose, Macular degeneration, Osteopenia, and Urinary urgency. has a past surgical history that includes Cataract removal with implant (Left) and Colonoscopy (2006).     Treatment Diagnosis: general weakness      Subjective   General  Chart Reviewed: Yes  Patient assessed for rehabilitation services?: Yes  Family / Caregiver Present: Yes  Referring Practitioner: SHERRY Dunbar  Diagnosis: syncope and collapse  Subjective  Subjective: Patient rec'd in bed, pleasant and cooperative 80 yr old female with recent fall  Patient Currently in Pain: Denies  Vital Signs  Patient Currently in Pain: Denies     Social/Functional History  Social/Functional History  Lives With: Alone(daughters come over every day and assist)  Type of Home: House  Home Layout: One level  Home Access: Stairs to enter with rails  Home Equipment: [address]  ADL Assistance: Needs assistance  Homemaking Assistance: Needs assistance  Homemaking Responsibilities: Yes  Meal Prep Responsibility: Secondary  Laundry Responsibility: Secondary Cleaning Responsibility: Secondary  Shopping Responsibility: Secondary  Ambulation Assistance: Independent  Transfer Assistance: Independent  Active : No  Leisure & Hobbies: Read and watch TV       Objective   Vision: Impaired  Vision Exceptions: Wears glasses at all times  Hearing: Exceptions to [facility]  Hearing Exceptions: Bilateral hearing aid    Orientation  Overall Orientation Status: Within Functional Limits        ADL  LE Dressing: Stand by assistance  Toileting: Minimal assistance        Bed mobility  Supine to Sit: Minimal assistance  Sit to Supine: Moderate assistance  Scooting:  Moderate assistance  Transfers  Sit to stand: Minimal assistance  Stand to sit: Minimal assistance     Cognition  Overall Cognitive Status: Exceptions  Safety Judgement: Decreased awareness of need for safety    LUE AROM (degrees)  LUE AROM : WFL  Left Hand AROM (degrees)  Left Hand AROM: WFL  RUE AROM (degrees)  RUE AROM : WFL  Right Hand AROM (degrees)  Right Hand AROM: WFL  LUE Strength  Gross LUE Strength: WFL  RUE Strength  Gross RUE Strength: WFL        Plan   Plan  Times per week: 1-2    Goals  Short term goals  Time Frame for Short term goals: duration of hospital stay  Short term goal 1: Patient to complete toilet transfer and toileting tasks with S/SBA using a/e as needed  Short term goal 2: Patient to complete ADL tasks standing at sink with S/SBA using a/e as needed       Therapy Time   Individual Concurrent Group Co-treatment   Time In 1635         Time Out 1645         Minutes 10         Timed Code Treatment Minutes: 0 Minutes       AUGUSTO LUNSFORD OTR, OT

## 2021-01-27 NOTE — PROGRESS NOTES
Physical Therapy    Facility/Department: [facility]  PROGRESSIVE CARE  Initial Assessment    NAME: Dawson Ngo  : 1927  MRN: [id number]    Date of Service: 2021    Discharge Recommendations:  Home with assist PRN, Home independently        Assessment   Body structures, Functions, Activity limitations: Decreased functional mobility ; Decreased balance;Decreased posture;Decreased ADL status; Decreased strength;Decreased safe awareness;Decreased cognition;Decreased endurance  Prognosis: Good  Decision Making: Low Complexity  PT Education: Goals; General Safety;Gait Training;PT Role;Plan of Care;Home Exercise Program;Transfer Training;Energy Conservation  Activity Tolerance  Activity Tolerance: Patient limited by endurance       Patient Diagnosis(es): The primary encounter diagnosis was Fall, initial encounter. Diagnoses of Acute cystitis without hematuria and Elevated troponin were also pertinent to this visit. has a past medical history of Hyperlipidemia, Hypertension, Impaired fasting glucose, Macular degeneration, Osteopenia, and Urinary urgency. has a past surgical history that includes Cataract removal with implant (Left) and Colonoscopy (2006).     Restrictions     Vision/Hearing  Vision: Impaired  Vision Exceptions: Wears glasses at all times  Hearing: Exceptions to [facility]  Hearing Exceptions: Bilateral hearing aid     Subjective             Orientation  Orientation  Overall Orientation Status: (Pt requires extra time to respond and often requires multiple cues to redirect)  Social/Functional History  Social/Functional History  Lives With: Daughter  Type of Home: House  Home Layout: One level  Home Access: Stairs to enter with rails(1 KHARI)  Home Equipment: [address] Forde  ADL Assistance: Needs assistance  Homemaking Responsibilities: Yes  Meal Prep Responsibility: Secondary  Laundry Responsibility: Secondary  Cleaning Responsibility: Secondary  Shopping Responsibility: Secondary  Active : No Leisure & Hobbies: Read and watch TV    Objective          AROM RLE (degrees)  RLE AROM: WFL  AROM LLE (degrees)  LLE AROM : WFL  Strength RLE  Strength RLE: Exception  R Hip Flexion: 4-/5  R Hip ABduction: 4-/5  R Hip ADduction: 4-/5  R Knee Flexion: 4-/5  R Knee Extension: 4-/5  Strength LLE  Strength LLE: Exception  L Hip Flexion: 4-/5  L Hip ABduction: 4-/5  L Hip ADduction: 4-/5  L Knee Flexion: 4-/5  L Knee Extension: 4-/5        Bed mobility  Bridging: Moderate assistance  Rolling to Left: Minimal assistance  Rolling to Right: Minimal assistance  Supine to Sit: Minimal assistance  Sit to Supine: Minimal assistance  Scooting: Moderate assistance  Transfers  Sit to Stand: Stand by assistance  Stand to sit: Stand by assistance  Bed to Chair: Stand by assistance  Stand Pivot Transfers: Stand by assistance  Squat Pivot Transfers: Stand by assistance  Lateral Transfers: Stand by assistance  Ambulation  Ambulation?: Yes  Ambulation 1  Surface: level tile  Device: Single point cane  Assistance: Minimal assistance  Gait Deviations: Slow Denice; Increased KADY; Decreased step length;Decreased step height  Distance: 25'  Comments: Pt required tactile cues for assist and postural sway correction     Balance  Posture: Fair  Sitting - Static: Fair;+  Sitting - Dynamic: Fair  Standing - Static: Fair  Standing - Dynamic: Fair;-        Plan   Plan  Times per week: 7  Plan weeks: 1  Current Treatment Recommendations: Strengthening, Neuromuscular Re-education, Home Exercise Program, Manual Therapy - Soft Tissue Mobilization, Balance Training, Endurance Training, Functional Mobility Training, Transfer Training, Gait Training, ADL/Self-care Training  Safety Devices  Type of devices: Call light within reach, Left in bed, Nurse notified         Goals  Short term goals  Time Frame for Short term goals: 7 days  Short term goal 1: assess functional mobility and initate HEP Short term goal 2: pt to perform bed mobililty with SBA to prepare for D/C  Short term goal 3: Pt to perform functional transfers with Supervision to prepare for D/C  Short term goal 4: Pt to ambulate 50' x 1 and SBA to prepare for D/C       Therapy Time   Individual Concurrent Group Co-treatment   Time In 1040         Time Out 1100         Minutes 20         Timed Code Treatment Minutes: 8 Minutes       Martha Hassan, SPT

## 2021-01-27 NOTE — PROGRESS NOTES
Hospitalist Progress Note    Patient:  Thao Nielsen     YOB: 1927    MRN: [id number]   Admit date: 1/26/2021     Acct: [de-identified]     PCP: Chase Jordan MD    CC--Interval History:   Syncope---no further events---seen by Cardiology---medications adjusted----doubts ACS    Falls---physical--OT therapies    Ca = 8.3 ---> calcium replacement    HGB--8.3    UTI--POA----Rocephin IV    Blood culture----1/2 [+] for S. epidermidis--usually and likely a contaminant--no intervention indication    CHF--acute on chronic diastolic with severe valvular disease----stopping NS ---> IV diuretics    Elevated lactic acid at admission---sepsis ruled out----lactic acid testing stopped    See note below       All other ROS negative except noted in HPI    Diet:  DIET CARDIAC;   Diet NPO Time Specified    Medications:  Scheduled Meds:   calcium elemental  1,500 mg Oral Daily    Vitamin D  2,000 Units Oral Daily    [Held by provider] furosemide  40 mg Oral Daily    latanoprost  1 drop Both Eyes Nightly    lisinopril  5 mg Oral Daily    metoprolol tartrate  25 mg Oral BID    PreserVision AREDS  1 capsule Oral BID    timolol  1 drop Both Eyes QAM    cefTRIAXone (ROCEPHIN) IV  1,000 mg Intravenous Q24H    sodium chloride flush  10 mL Intravenous 2 times per day    enoxaparin  30 mg Subcutaneous Daily    atorvastatin  10 mg Oral Nightly    hydroxyurea  500 mg Oral Nightly    aspirin  81 mg Oral Daily    furosemide  20 mg Intravenous Daily     Continuous Infusions:   sodium chloride 100 mL/hr at 01/27/21 0302     PRN Meds:loperamide, sodium chloride flush, acetaminophen, sodium chloride nebulizer, albuterol, melatonin    Objective:  Labs:  CBC with Differential:    Lab Results   Component Value Date    WBC 7.8 01/27/2021    RBC 3.10 01/27/2021    HGB 7.8 01/27/2021    HCT 27.8 01/27/2021     01/27/2021    MCV 89.7 01/27/2021    MCH 25.2 01/27/2021    MCHC 28.1 01/27/2021    RDW 27.8 01/27/2021 NRBC 2 12/04/2020    LYMPHOPCT 14 01/26/2021    MONOPCT 3 01/26/2021    BASOPCT 1 01/26/2021    MONOSABS 0.25 01/26/2021    LYMPHSABS 1.18 01/26/2021    EOSABS 0.00 01/26/2021    BASOSABS 0.08 01/26/2021    DIFFTYPE NOT REPORTED 01/26/2021     BMP:    Lab Results   Component Value Date     01/27/2021    K 3.7 01/27/2021     01/27/2021    CO2 20 01/27/2021    BUN 24 01/27/2021    LABALBU 3.6 01/26/2021    CREATININE 0.85 01/27/2021    CALCIUM 8.0 01/27/2021    GFRAA >60 01/27/2021    LABGLOM >60 01/27/2021    GLUCOSE 90 01/27/2021           Physical Exam:  Vitals: /68   Pulse 94   Temp 97.9 °F (36.6 °C) (Oral)   Resp 23   Ht 5' 2\" (1.575 m)   Wt 124 lb 3.2 oz (56.3 kg)   SpO2 100%   BMI 22.72 kg/m²   24 hour intake/output:    Intake/Output Summary (Last 24 hours) at 1/27/2021 0731  Last data filed at 1/27/2021 0532  Gross per 24 hour   Intake 3544 ml   Output    Net 3544 ml     Last 3 weights: Wt Readings from Last 3 Encounters:   01/26/21 124 lb 3.2 oz (56.3 kg)   01/22/21 120 lb (54.4 kg)   01/08/21 116 lb (52.6 kg)     HEENT: O2 NC---, Normocephalic and Atraumatic  Neck: Supple, No Masses, Tenderness, Nodularity and No Lymphadenopathy  Chest/Lungs: coarse breath sounds  Cardiac: Regular Rate and Rhythm---occasional ectopics  GI/Abdomen: Bowel Sounds Present and Soft, Non-tender, without Guarding or Rebound Tenderness  : Not examined  EXT/Skin: No Edema, No Cyanosis and No Clubbing  Neuro: alert--- and No Localizing Signs/Symptoms      Assessment:    Active Problems:    Syncope and collapse  Resolved Problems:    * No resolved hospital problems. *    NICOLE MARIN      [[facility]]  89  WF  [s Astrid, IM;  DC Cardiology---TCC]  FULL CODE     LOVENOX 30           COVID19---NEGATIVEModerna vaccine #8[phone]    Anti-infectives:   Rocephin IV    Syncope---1.26.2021  Fall1.26.2021  Elevated troponin---1.26.2021---per Cardiology doubt ACS CXR---1.27.2021clearnormal heart-lungs---total right shoulder arthroplasty          EKG-----1.27.2021atrial fibrillation---intermittent SR94RADlow voltage---RBBBvs--IVCD         CXR1.26.2021hypoventilated lungssmall left > right pleural effusions---cardiomegaly         CT head----1.26.2021no MBSmicrovascular disease         CT---c-spine---1.26.2021cervical degenerative diseaseno traumatic findings         EKG1.26.2021SR98---PACsRBBB         2D ECHO---1.13.2021---LA severely dilatedNLVSF---abnormal septal wall motionleftward                                   compress IVS D-sign RV pressure overload---RA severely dilated                                   RV dilatation---reduced RVSFBROWN but opens well----MACsevere MR---                                   severe TR---normal AR 3.3 cmincreased IVC diameterimpaired                                    or no respiratory variation---RVSP  ~ 36 mm HgLVEF ~ 60%  UTIPOA---1.26.2021  BLOOD CULTURE[+]1.26.2021S. epidermidis---1/2 contaminant  Elevated lactic acid level---1.26.2021  CKD---3  CHFacute chronic diastolicsevere valvular heart disease  Valvular heart disease        Severe MR        Severe TR  Hypertension  Hyperlipidemia   Impaired fasting glucose  Non-rheumatic MR  CKD  Anemia---chronic  Thrombocytosis---chronic---hydroxyurea  Hypokalemia  Hyponatremia   hypocalcemia  PMH:   BLE edema, chronic bilateral lower back pain, chronic pain left ankle, macular degeneration,              open wound right ankle, osteopenia, urinary urgency  PSH:    colonoscopy2006, left cataract---IOL, right shoulder arthroplasty    Allergies:  Macrobidnitrofurantoin----rash,  sulfa---rash        Plan:  1.    Seen by Cardiology---decreased lisinopril to 2.5 mg PO daily--cont'd metoprolol---compression stockings---                        IV Lasix [facility] ---> Lasix 20 mg PO when home----orthostatics

## 2021-01-27 NOTE — PROGRESS NOTES
SW attempted to meet with patient to assess needs. Patient was unavailable at this time. SW will continue to monitor needs and assist as appropriate.          Electronically signed by IRENA Zapata on 1/27/2021 at 11:01 AM

## 2021-01-28 NOTE — CARE COORDINATION
Daughters discussing SNF placement at discharge as they do not feel they can take care of patient at home at this time due to weakness. List of SNF's provided. Daughters will discuss and notify writer of decision. Business card provided.

## 2021-01-28 NOTE — FLOWSHEET NOTE
rounding in PCU    Assessment: Patient sleeping. Daughter sitting on couch. Daughter welcoming and invited  to sit with her. Daughter shares that patient has recently stated that Leonel Smoker is ready to die. \" Daughter wants her mother to be able to have relief from her breathing troubles and is hopeful that current treatment will do so. Patient's  visited and prayed with her yesterday. Intervention:  provided supportive presence via active listening, caring conversation, and prayer. Outcome: Daughter thanked  for talking and praying with her. Patient still sleeping. Plan: Chaplains will remain available to offer spiritual and emotional support as needed. 01/28/21 1659   Encounter Summary   Services provided to: Family   Referral/Consult From: [address] Family members; Mosque/rajinder community   Continue Visiting   (1/28/21)   Complexity of Encounter Moderate   Length of Encounter 15 minutes   Spiritual Assessment Completed Yes   Spiritual/Druze   Type Spiritual support   Assessment Approachable   Intervention Active listening;Prayer;Explored feelings, thoughts, concerns   Outcome Expressed gratitude;Engaged in conversation   Electronically signed by Quan Doshi on 1/28/2021 at 5:04 PM

## 2021-01-28 NOTE — PROGRESS NOTES
Physical Therapy  Facility/Department: [id number] CARE  Daily Treatment Note  NAME: Liane Shaver  : 1927  MRN: [id number]    Date of Service: 2021    Discharge Recommendations:  Home with assist PRN, Home independently        Assessment   Body structures, Functions, Activity limitations: Decreased functional mobility ; Decreased balance;Decreased posture;Decreased ADL status; Decreased strength;Decreased safe awareness;Decreased cognition;Decreased endurance  Prognosis: Good  Decision Making: Low Complexity  PT Education: Goals; General Safety;Gait Training;PT Role;Plan of Care;Home Exercise Program;Transfer Training;Energy Conservation  REQUIRES PT FOLLOW UP: Yes  Activity Tolerance  Activity Tolerance: Patient limited by endurance     Patient Diagnosis(es): The primary encounter diagnosis was Fall, initial encounter. Diagnoses of Acute cystitis without hematuria and Elevated troponin were also pertinent to this visit. has a past medical history of Hyperlipidemia, Hypertension, Impaired fasting glucose, Macular degeneration, Osteopenia, and Urinary urgency. has a past surgical history that includes Cataract removal with implant (Left) and Colonoscopy (2006). Restrictions     Subjective   General  Chart Reviewed: Yes  Response To Previous Treatment: Patient with no complaints from previous session. Family / Caregiver Present: No  Subjective  Subjective: Pt is semi reclined in bed upon arrival, agreeable to therapy.   Pain Screening  Patient Currently in Pain: Denies  Vital Signs  Patient Currently in Pain: Denies       Orientation  Orientation  Overall Orientation Status: Within Normal Limits  Cognition      Objective   Bed mobility  Rolling to Left: Minimal assistance  Supine to Sit: Minimal assistance  Sit to Supine: Minimal assistance  Transfers  Sit to Stand: Stand by assistance  Stand to sit: Stand by assistance  Stand Pivot Transfers: Stand by assistance  Ambulation Ambulation?: No(Pt was not whiling to walk, stating she was to tired)     Balance  Posture: Fair  Sitting - Static: Fair;+  Sitting - Dynamic: Fair  Standing - Static: Fair  Standing - Dynamic: Fair;-  Exercises  Straight Leg Raise: 10x  Heelslides: 10x  Hip Flexion: 10x seated marches  Hip Abduction: 10x  Ankle Pumps: 10x                        G-Code     OutComes Score                                                     AM-PAC Score             Goals  Short term goals  Time Frame for Short term goals: 7 days  Short term goal 1: assess functional mobility and initate HEP  Short term goal 2: pt to perform bed mobililty with SBA to prepare for D/C  Short term goal 3: Pt to perform functional transfers with Supervision to prepare for D/C  Short term goal 4: Pt to ambulate 50' x 1 and SBA to prepare for D/C    Plan    Plan  Times per week: 7  Times per day: Daily  Plan weeks: 1  Current Treatment Recommendations: Strengthening, Neuromuscular Re-education, Home Exercise Program, Manual Therapy - Soft Tissue Mobilization, Balance Training, Endurance Training, Functional Mobility Training, Transfer Training, Gait Training, ADL/Self-care Training  Safety Devices  Type of devices: Call light within reach, Left in bed, Nurse notified     Therapy Time   Individual Concurrent Group Co-treatment   Time In 1044         Time Out 1102         Minutes [address] [place] [address]

## 2021-01-28 NOTE — PROGRESS NOTES
MCHC 29.0 01/28/2021    RDW 27.9 01/28/2021    NRBC 2 12/04/2020    LYMPHOPCT 19 01/28/2021    MONOPCT 3 01/28/2021    BASOPCT 0 01/28/2021    MONOSABS 0.18 01/28/2021    LYMPHSABS 1.16 01/28/2021    EOSABS 0.00 01/28/2021    BASOSABS 0.00 01/28/2021    DIFFTYPE NOT REPORTED 01/28/2021     BMP:    Lab Results   Component Value Date     01/28/2021    K 4.0 01/28/2021    CL 99 01/28/2021    CO2 21 01/28/2021    BUN 26 01/28/2021    LABALBU 3.6 01/26/2021    CREATININE 0.81 01/28/2021    CALCIUM 8.6 01/28/2021    GFRAA >60 01/28/2021    LABGLOM >60 01/28/2021    GLUCOSE 94 01/28/2021           Physical Exam:  Vitals: BP (!) 142/69   Pulse 95   Temp 101.7 °F (38.7 °C) (Tympanic)   Resp 23   Ht 5' 2\" (1.575 m)   Wt 126 lb 11.2 oz (57.5 kg)   SpO2 96%   BMI 23.17 kg/m²   24 hour intake/output:    Intake/Output Summary (Last 24 hours) at 1/28/2021 1301  Last data filed at 1/28/2021 1002  Gross per 24 hour   Intake 1697 ml   Output 100 ml   Net 1597 ml     Last 3 weights: Wt Readings from Last 3 Encounters:   01/28/21 126 lb 11.2 oz (57.5 kg)   01/22/21 120 lb (54.4 kg)   01/08/21 116 lb (52.6 kg)     HEENT: Normocephalic and Atraumatic  Neck: Supple, No Masses, Tenderness, Nodularity and No Lymphadenopathy  Chest/Lungs: Clear to Auscultation without Rales, Rhonchi, or Wheezes and Distant Breath Sounds  Cardiac: Regular Rate and Rhythm  GI/Abdomen: Bowel Sounds Present and Soft, Non-tender, without Guarding or Rebound Tenderness  : Not examined  EXT/Skin: No Edema, No Cyanosis and No Clubbing  Neuro: alert---generalized weakness      Assessment:    Active Problems:    Syncope and collapse  Resolved Problems:    * No resolved hospital problems. *    NICOLE MARIN      [[facility]]  94  WF  [eva [facility] IM;  DC Cardiology---[facility]]  FULL CODE     LOVENOX 30           COVID19---NEGATIVEModerna vaccine #1[id number]    Anti-infectives:   Rocephin IV,   Vancomycin IV    Syncope---1.26.2021  Fall1.26.2021 Elevated troponin---1.26.2021---per Cardiology doubt ACS         CXR---1.28.2021bilateral pleural effusioncardiomegaly---senescent changes age appropriate         RLR---FI2.42.5004---4-78% BETH-LICApatent antegrade vertebrals         CXR---1.27.2021clearnormal heart-lungs---total right shoulder arthroplasty          EKG-----1.27.2021atrial fibrillation---intermittent SR94RADlow voltage---RBBBvs--IVCD         CXR1.26.2021hypoventilated lungssmall left > right pleural effusions---cardiomegaly         CT head----1.26.2021no MBSmicrovascular disease         CT---c-spine---1.26.2021cervical degenerative diseaseno traumatic findings         EKG1.26.2021SR98---PACsRBBB         2D ECHO---1.13.2021---LA severely dilatedNLVSF---abnormal septal wall motionleftward                                   compress IVS D-sign RV pressure overload---RA severely dilated                                   RV dilatation---reduced RVSFBROWN but opens well----MACsevere MR---                                   severe TR---normal AR 3.3 cmincreased IVC diameterimpaired                                    or no respiratory variation---RVSP  ~ 36 mm HgLVEF ~ 60%  E coli and Enterococcus faecalis----UTIPOA---1.26.2021  BLOOD CULTURE[+]1.26.2021S.  epidermidis               2D ECHO----1.28.2021LA severely dilated---NLVSFabnormal septal wall motion                                   leftward compression IVS D-sign-RV volume overloadRA severely dilated                                   severe RV dilatationreduced RVSF---MAC MV thickening                                   moderate-to-severe MRBROWN but opens wellTV leaflets normal but                                     poor coaptation---severe TRRVSP ~ 39 mm Hgnormal AR 3.0 cm---                                    IVC diameter with impaired or no inspiratory variationDD not evaluated---- LVEF ~ 55%---no obvious thrombus---vegetation or shunt  Elevated lactic acid level---1.26.2021  CKD---3  CHFacute chronic diastolicsevere valvular heart disease  Valvular heart disease        Severe MR        Severe TR  Hypertension  Hyperlipidemia   Impaired fasting glucose  Non-rheumatic MR  CKD  Anemia---chronic  Thrombocytosis---chronic---hydroxyurea  Hypokalemia  Hyponatremia   hypocalcemia  PMH:   BLE edema, chronic bilateral lower back pain, chronic pain left ankle, macular degeneration,              open wound right ankle, osteopenia, urinary urgency  PSH:    colonoscopy2006, left cataract---IOL, right shoulder arthroplasty    Allergies:  Macrobidnitrofurantoin----rash,  sulfa---rash        Plan:  1. UTI---Rocephin  2. S epi--Vancomycin  3. Physcial OT therapies  4. Sodium replacement  5. Potassium replacement  6.   See orders      Electronically signed by Bola Grewal on 1/28/2021 at 1:01 PM    Hospitalist

## 2021-01-29 NOTE — PROGRESS NOTES
Occupational Therapy  Facility/Department: [facility]  PROGRESSIVE CARE  Daily Treatment Note  NAME: Becki Kaye  : 1927  MRN: [id number]    Date of Service: 2021    Discharge Recommendations:  Home with assist PRN       Assessment   Performance deficits / Impairments: Decreased functional mobility ; Decreased ADL status; Decreased safe awareness;Decreased endurance;Decreased balance;Decreased high-level IADLs  Treatment Diagnosis: general weakness  Prognosis: Good  OT Education: OT Role;Transfer Training  REQUIRES OT FOLLOW UP: No  Activity Tolerance  Activity Tolerance: Patient Tolerated treatment well;Patient limited by fatigue  Safety Devices  Safety Devices in place: Yes  Type of devices: Left in bed;Bed alarm in place;Call light within reach         Patient Diagnosis(es): The primary encounter diagnosis was Fall, initial encounter. Diagnoses of Acute cystitis without hematuria and Elevated troponin were also pertinent to this visit. has a past medical history of Hyperlipidemia, Hypertension, Impaired fasting glucose, Macular degeneration, Osteopenia, and Urinary urgency. has a past surgical history that includes Cataract removal with implant (Left) and Colonoscopy (2006).     Restrictions     Subjective   General  Chart Reviewed: Yes  Patient assessed for rehabilitation services?: Yes  Response to previous treatment: Patient reporting fatigue but able to participate  Family / Caregiver Present: No  Referring Practitioner: SHERRY Andino  Diagnosis: syncope and collapse  Subjective  Subjective: Patient rec'd in bed, pleasant and cooperative 80 yr old female with recent fall  Vital Signs  Patient Currently in Pain: Denies  Oxygen Therapy  SpO2: 92 %  O2 Device: Nasal cannula  O2 Flow Rate (L/min): 2 L/min      Orientation  Orientation  Overall Orientation Status: Within Functional Limits     Objective    ADL  Grooming: Stand by assistance;Supervision(brushing hair at sink) Toileting: Stand by assistance;Supervision(clothing management and personal hygiene)        Functional Mobility  Functional - Mobility Device: Cane  Activity: To/from bathroom  Assist Level: Contact guard assistance  Functional Mobility Comments: Slouched posure and slow pace noted  Toilet Transfers  Toilet - Technique: Ambulating  Equipment Used: Grab bars  Toilet Transfer: Stand by [employer] Transfers Comments: Demo'd Fair+ safety  Bed mobility  Supine to Sit: Stand by assistance  Sit to Supine: Stand by assistance  Scooting: Stand by assistance  Comment: Increased time required  Transfers  Sit to stand: Stand by assistance  Stand to sit: Stand by assistance     Plan   Plan  Times per week: 1-2    Goals  Short term goals  Time Frame for Short term goals: duration of hospital stay  Short term goal 1: Patient to complete toilet transfer and toileting tasks with S/SBA using a/e as needed - GOAL MET  Short term goal 2: Patient to complete ADL tasks standing at sink with S/SBA using a/e as needed - GOAL MET       Therapy Time   Individual Concurrent Group Co-treatment   Time In 1045         Time Out 1109         Minutes 24         Timed Code Treatment Minutes: [address], [place]

## 2021-01-29 NOTE — PLAN OF CARE
Problem: Falls - Risk of:  Goal: Will remain free from falls  Description: Will remain free from falls  Outcome: Ongoing  Note: Call light in reach, bed in lowest position, and bed alarm activated. Education given on use of call light before ambulation and when in need of assistance. Patient expressed understanding. Hourly visual checks performed and charted. Toileting offered to patient. No falls this shift, at any time. Arm band and falling star in place. Will continue to monitor. Problem: Urinary Elimination:  Goal: Signs and symptoms of infection will decrease  Description: Signs and symptoms of infection will decrease  Outcome: Ongoing  Note: Urinary output adequate. Patient voided several times during shift with output >30ml/hr. No complaints of dysuria, fullness, or distention. Will continue to monitor. Problem: Discharge Planning:  Goal: Discharged to appropriate level of care  Description: Discharged to appropriate level of care  Outcome: Ongoing  Note: Discharge planning in process and discussed with patient/family. Social work consulted for any additional needs. Care manager aware of discharge needs. Patient will be going to a rehab facility. Problem: Pain:  Goal: Pain level will decrease  Description: Pain level will decrease  Outcome: Ongoing  Note: Patient able to use 0-10 pain scale. Denies pain at this time. Agreeable to take PRN pain medications. Problem: Skin Integrity:  Goal: Will show no infection signs and symptoms  Description: Will show no infection signs and symptoms  Outcome: Ongoing  Note: No signs of skin breakdown. Skin warm, dry, and intact. Mucous membranes pink and moist.  Assistance with turns/ambulation provided PRN. Will continue to monitor. Care plan reviewed with patient. Patient verbalize understanding of the plan of care and contribute to goal setting.

## 2021-01-29 NOTE — PROGRESS NOTES
Stand to sit: Unable to assess  Bed to Chair: Unable to assess  Stand Pivot Transfers: Unable to assess  Squat Pivot Transfers: Unable to assess  Lateral Transfers: Unable to assess  Car Transfer: Unable to assess  Ambulation  Ambulation?: No  More Ambulation?: No  Ambulation 1  Comments: Patient refused to ambulate this date. Stairs/Curb  Stairs?: No        Exercises  Straight Leg Raise: 10x  Hamstring Sets: x10  Quad Sets: x20  Heelslides: attempted, patient unable to perform AROM or AAROM. Ankle Pumps: x20  Comments: Patient appeared very fatigued at beginning of session and required moderate cueing for performance of above exercises. Other exercises  Other exercises?: No                        G-Code     OutComes Score                                                     AM-PAC Score             Goals  Short term goals  Time Frame for Short term goals: 7 days  Short term goal 1: assess functional mobility and initate HEP  Short term goal 2: pt to perform bed mobililty with SBA to prepare for D/C  Short term goal 3: Pt to perform functional transfers with Supervision to prepare for D/C  Short term goal 4: Pt to ambulate 50' x 1 and SBA to prepare for D/C    Plan    Plan  Times per week: 7  Times per day: Daily  Plan weeks: 1  Current Treatment Recommendations: Strengthening, Neuromuscular Re-education, Home Exercise Program, Manual Therapy - Soft Tissue Mobilization, Balance Training, Endurance Training, Functional Mobility Training, Transfer Training, Gait Training, ADL/Self-care Training  Safety Devices  Type of devices: Bed alarm in place, Left in bed, Call light within reach, Nurse notified(Upon notifying nursing at conclusion of treatment nurse stated pt had a fever this date)     Therapy Time   Individual Concurrent Group Co-treatment   Time In 0913         Time Out 0927         Minutes 14              Patient treated by Gregorio LEIGH under direct, one-on-one supervision of licensed PTA. Batool Scott, PTA
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
## 2021-01-30 NOTE — DISCHARGE SUMMARY
Discharge Summary    Saul Saini Patient Status:  Inpatient    1927 MRN [id number]   Location [address] Attending Alissa Faria Day # 3 PCP Dawit Ray MD     Date of Admission: 2021    Date of Discharge: 2021    Admitting Diagnosis: Syncope and collapse [R55]    Discharge Diagnosis:   Fall   UTI.  HTN. Bactremia. Chronic diastolic CHF. Valvular heart disease. Thrombocytosis    Reason for Admission/HPI: Patient presented to ER with fall at home. went down to the floor when she got up from bed and walkd several steps,Denied passing out. No chest pain    Hospital Course: Patient was IV diuretics for Diastolic CHF,IV Rocephin for UTI,IVF till lactic acidosis corrected.had mildly elevated trop MI ,ruled outCardilogy consulted known h/o valvular heart diseae with preserved EF,recommended decreasing  lisinopril to 2.5 mg,lasix to 20 mg daily,optimized medical thearpy. Has been transitioned to oral levaquin for UTI  With ECOLI based on C&S . Also had positive blood culture with staph epidermis also sensitive to levaquin. patient is back to her baseline lungs clear on exam ,heart is regular,BP has bben stable today. Labs are stable today. CXR with some basal atelectasis. Recieved PT/OT while here,will need rehab so discharged to swing bed. Consultations: CARDIOLOGY. PT/OT    Procedures: ECHO cardiogram with preserved EF with severe MR and TR. Complications: none    Disposition:  To swing bed    Discharge Condition: [address]    Discharge Medications:    Kelsy Prude [facility]"   Home Medication Instructions ZVW:[id number]    Printed on:21 0935   Medication Information                      albuterol (PROVENTIL) (2.5 MG/3ML) 0.083% nebulizer solution  Take 3 mLs by nebulization every 6 hours as needed for Wheezing             aspirin 81 MG tablet  Take 81 mg by mouth daily             atorvastatin (LIPITOR) 10 MG tablet  TAKE ONE TABLET BY MOUTH ONCE DAILY Calcium 1500 MG TABS  daily. Cholecalciferol (VITAMIN D3) 2000 units CAPS  Take 2,000 Units by mouth daily             furosemide (LASIX) 20 MG tablet  Take 1 tablet by mouth daily             Handicap Placard MISC  by Does not apply route Exp 10/30/2021             hydroxyurea (HYDREA) 500 MG chemo capsule  Take 1 capsule by mouth daily             latanoprost (XALATAN) 0.005 % ophthalmic solution  Place 1 drop into both eyes nightly             levoFLOXacin (LEVAQUIN) 250 MG tablet  Take 1 tablet by mouth daily for 7 days             lisinopril (PRINIVIL;ZESTRIL) 2.5 MG tablet  Take 1 tablet by mouth daily             loperamide (IMODIUM) 2 MG capsule  Take 1 capsule by mouth 3 times daily as needed for Diarrhea             melatonin 3 MG TABS tablet  Take 1 tablet by mouth nightly as needed (sleep)             metoprolol tartrate (LOPRESSOR) 25 MG tablet  take 1 tablet by mouth twice a day             miconazole (MICOTIN) 2 % powder  Apply topically 2 times daily. Multiple Vitamins-Minerals (PRESERVISION AREDS) CAPS  Take  by mouth 2 times daily. sodium chloride 1 g tablet  Take 1 tablet by mouth daily             timolol (TIMOPTIC) 0.5 % ophthalmic solution  Place 1 drop into both eyes every morning                 Follow up Visits:  Follow-up with PCP in [place]      Total Time spent with patient and coordinating care:  40 minutes    Dulce Fritz MD  1/30/2021  9:35 AM

## 2021-01-30 NOTE — PLAN OF CARE
Problem: Falls - Risk of:  Goal: Will remain free from falls  Description: Will remain free from falls  Outcome: Ongoing  Goal: Absence of physical injury  Description: Absence of physical injury  Outcome: Ongoing     Problem: Sensory:  Goal: General experience of comfort will improve  Description: General experience of comfort will improve  Outcome: Ongoing     Problem: Urinary Elimination:  Goal: Signs and symptoms of infection will decrease  Description: Signs and symptoms of infection will decrease  Outcome: Ongoing  Goal: Ability to reestablish a normal urinary elimination pattern will improve - after catheter removal  Description: Ability to reestablish a normal urinary elimination pattern will improve  Outcome: Ongoing  Goal: Complications related to the disease process, condition or treatment will be avoided or minimized  Description: Complications related to the disease process, condition or treatment will be avoided or minimized  Outcome: Ongoing     Problem: Discharge Planning:  Goal: Discharged to appropriate level of care  Description: Discharged to appropriate level of care  Outcome: Ongoing     Problem: Pain:  Goal: Pain level will decrease  Description: Pain level will decrease  Outcome: Ongoing  Goal: Control of acute pain  Description: Control of acute pain  Outcome: Ongoing  Goal: Control of chronic pain  Description: Control of chronic pain  Outcome: Ongoing     Problem: Skin Integrity:  Goal: Will show no infection signs and symptoms  Description: Will show no infection signs and symptoms  Outcome: Ongoing  Goal: Absence of new skin breakdown  Description: Absence of new skin breakdown  Outcome: Ongoing

## 2021-01-30 NOTE — PROGRESS NOTES
Physical Therapy  Facility/Department: [facility]  PROGRESSIVE CARE  Daily Treatment Note  NAME: Yaritza Gooden  : 1927  MRN: [id number]    Date of Service: 2021    Discharge Recommendations:  Home with assist PRN, Home independently        Assessment   Body structures, Functions, Activity limitations: Decreased functional mobility ; Decreased balance;Decreased posture;Decreased ADL status; Decreased strength;Decreased safe awareness;Decreased cognition;Decreased endurance  Prognosis: Good  Decision Making: Low Complexity  PT Education: Goals; General Safety;Gait Training;PT Role;Plan of Care;Home Exercise Program;Transfer Training;Energy Conservation  REQUIRES PT FOLLOW UP: Yes  Activity Tolerance  Activity Tolerance: Patient limited by fatigue;Patient limited by endurance     Patient Diagnosis(es): The primary encounter diagnosis was Fall, initial encounter. Diagnoses of Acute cystitis without hematuria, Elevated troponin, and SOB (shortness of breath) were also pertinent to this visit. has a past medical history of Hyperlipidemia, Hypertension, Impaired fasting glucose, Macular degeneration, Osteopenia, and Urinary urgency. has a past surgical history that includes Cataract removal with implant (Left) and Colonoscopy (2006). Restrictions     Subjective   General  Chart Reviewed: Yes  Response To Previous Treatment: Patient with no complaints from previous session. Family / Caregiver Present: No  Subjective  Subjective: Pt. in bed at initiation of session. Patient called out to use bedside commode. No c/o noted.   Pain Screening  Patient Currently in Pain: Denies  Pain Assessment  Pain Assessment: 0-10  Pain Level: 0  Vital Signs  Patient Currently in Pain: Denies  Oxygen Therapy  O2 Flow Rate (L/min): 2 L/min       Orientation  Orientation  Overall Orientation Status: Within Functional Limits  Cognition      Objective   Bed mobility  Supine to Sit: Minimal assistance

## 2021-05-09 ASSESSMENT — ENCOUNTER SYMPTOMS
VOMITING: 0
COUGH: 0
CONSTIPATION: 0
EYE PAIN: 0
NAUSEA: 0
ABDOMINAL PAIN: 0
DIARRHEA: 0
SHORTNESS OF BREATH: 0
BACK PAIN: 0